# Patient Record
Sex: FEMALE | Race: WHITE | Employment: FULL TIME | URBAN - METROPOLITAN AREA
[De-identification: names, ages, dates, MRNs, and addresses within clinical notes are randomized per-mention and may not be internally consistent; named-entity substitution may affect disease eponyms.]

---

## 2017-01-03 ENCOUNTER — OFFICE VISIT (OUTPATIENT)
Dept: PSYCHOLOGY | Facility: CLINIC | Age: 47
End: 2017-01-03
Payer: COMMERCIAL

## 2017-01-03 DIAGNOSIS — F43.23 ADJUSTMENT DISORDER WITH MIXED ANXIETY AND DEPRESSED MOOD: Primary | ICD-10-CM

## 2017-01-03 PROCEDURE — 90834 PSYTX W PT 45 MINUTES: CPT | Performed by: MARRIAGE & FAMILY THERAPIST

## 2017-01-03 ASSESSMENT — ANXIETY QUESTIONNAIRES
2. NOT BEING ABLE TO STOP OR CONTROL WORRYING: NEARLY EVERY DAY
3. WORRYING TOO MUCH ABOUT DIFFERENT THINGS: NEARLY EVERY DAY
1. FEELING NERVOUS, ANXIOUS, OR ON EDGE: NEARLY EVERY DAY
6. BECOMING EASILY ANNOYED OR IRRITABLE: NEARLY EVERY DAY
5. BEING SO RESTLESS THAT IT IS HARD TO SIT STILL: SEVERAL DAYS
GAD7 TOTAL SCORE: 18
7. FEELING AFRAID AS IF SOMETHING AWFUL MIGHT HAPPEN: NEARLY EVERY DAY

## 2017-01-03 ASSESSMENT — PATIENT HEALTH QUESTIONNAIRE - PHQ9: 5. POOR APPETITE OR OVEREATING: MORE THAN HALF THE DAYS

## 2017-01-03 NOTE — PROGRESS NOTES
Progress Note    Client Name: Yarelis SMITH Host  Date: 1/3/2017         Service Type: Individual      Session Start Time: 9:30  Session End Time: 10:15      Session Length: 45     Session #: Return     Attendees: Client attended alone    Treatment Plan Last Reviewed: 12/15/2016  PHQ-9 / ROSE-7 : Each session     DATA      Progress Since Last Session (Related to Symptoms / Goals / Homework):   Symptoms: Improved    Homework: Achieved / completed to satisfaction      Episode of Care Goals: Satisfactory progress - ACTION (Actively working towards change); Intervened by reinforcing change plan / affirming steps taken     Current / Ongoing Stressors and Concerns:  Client came on her own today and processed her thoughts and feelings about her situation with divorce process. Nader continues to try to get her to rehash the past and take responsibiilty for her role in the break up. He is wracked with guilt and shame and struggling emotionally. Elisabeth sees she has no choice but to go through with it and sees how Nader is trying to get her to take care of his feelings and somehow stay friends. She wants to continue to meet as a couple as she learns things in our sessions that help in her own healing. They are not going through mediation and will try to navigate on their own. Says she is ok with this as long as they can do it together. Also wants to focus on taking care of herself moving forward. Goals of getting her own place, losing weight, adjusting to new boss, etc. Better self care.      Treatment Objective(s) Addressed in This Session:   Begin dealing with what she has been avoiding.  Take steps toward divorce.  Told Nader he cannot joke with Elisabeth sexually as this is very confusing for Elisabeth and counter productive. He wants her to be nice which may not be realistic.  Also Nader can try working on validation with her.  Teach S/L to couple     Intervention:                              Solution Focused: divorce process        ASSESSMENT: Current Emotional / Mental Status (status of significant symptoms):   Risk status (Self / Other harm or suicidal ideation)   Client denies current fears or concerns for personal safety.   Client denies current or recent suicidal ideation or behaviors.   Client denies current or recent homicidal ideation or behaviors.   Client denies current or recent self injurious behavior or ideation.   Client denies other safety concerns.   A safety and risk management plan has not been developed at this time, however client was given the after-hours number / 911 should there be a change in any of these risk factors.     Appearance:   Appropriate    Eye Contact:   Good    Psychomotor Behavior: Normal    Attitude:   Cooperative    Orientation:   All   Speech    Rate / Production: Normal     Volume:  Normal    Mood:    Anxious  Irritable  Sad    Affect:    Appropriate    Thought Content:  Clear    Thought Form:  Coherent  Logical    Insight:    Good      Medication Review:   No changes to current psychiatric medication(s)     Medication Compliance:   Yes     Changes in Health Issues:   None reported     Chemical Use Review:   Substance Use: Chemical use reviewed, no active concerns identified      Tobacco Use: No current tobacco use.       Collateral Reports Completed:   Not Applicable    PLAN: (Client Tasks / Therapist Tasks / Other)  Support client in divorce process by helping them communicate through difficult circumstances. Teach them S/L technique next time. Acknowledged to couple that they obviously do care about each other b/c not many couples do this work as they are ending their marriage. Elisabeth to continue with individual therapy.          Farida Salcedo                                                         ________________________________________________________________________    Treatment Plan    Client's Name: Yarelis SMITH Host  Date Of  Birth: 1970    Date: 12/15/2016    DSM-V Diagnoses: Adjustment Disorders  309.28 (F43.23) With mixed anxiety and depressed mood  Psychosocial / Contextual Factors:  from  for 2 years  WHODAS: 19    Referral / Collaboration:  Referral to another professional/service is not indicated at this time..    Anticipated number of session or this episode of care: 10      MeasurableTreatment Goal(s) related to diagnosis / functional impairment(s)  Goal 1: Client will lower GAD7 and PHQ9 scores to 3 or below    I will know I've met my goal when I'm less anxious and depressed..      Objective #A (Client Action)    Client will clarify marital goals with her ..  Status: Continued - Date(s):12/15/2016     Intervention(s)  Therapist will help support client..    Objective #B  Client will take action to move forward with her life..  Status: Continued - Date(s):12/15/2016     Intervention(s)  Therapist will help client ID what is important to her..    Objective #C  Client will experience grief and loss.  Status: Continued - Date(s):12/15/2016     Intervention(s)  Therapist will teach client about g & l process.          Client has reviewed and agreed to the above plan.      Farida Salcedo  December 15, 2016

## 2017-01-04 ASSESSMENT — ANXIETY QUESTIONNAIRES: GAD7 TOTAL SCORE: 18

## 2017-01-04 ASSESSMENT — PATIENT HEALTH QUESTIONNAIRE - PHQ9: SUM OF ALL RESPONSES TO PHQ QUESTIONS 1-9: 15

## 2017-01-12 ENCOUNTER — OFFICE VISIT (OUTPATIENT)
Dept: PSYCHOLOGY | Facility: CLINIC | Age: 47
End: 2017-01-12
Payer: COMMERCIAL

## 2017-01-12 DIAGNOSIS — F43.23 ADJUSTMENT DISORDER WITH MIXED ANXIETY AND DEPRESSED MOOD: Primary | ICD-10-CM

## 2017-01-12 PROCEDURE — 90834 PSYTX W PT 45 MINUTES: CPT | Performed by: MARRIAGE & FAMILY THERAPIST

## 2017-01-12 ASSESSMENT — ANXIETY QUESTIONNAIRES
2. NOT BEING ABLE TO STOP OR CONTROL WORRYING: NEARLY EVERY DAY
3. WORRYING TOO MUCH ABOUT DIFFERENT THINGS: MORE THAN HALF THE DAYS
5. BEING SO RESTLESS THAT IT IS HARD TO SIT STILL: SEVERAL DAYS
GAD7 TOTAL SCORE: 15
IF YOU CHECKED OFF ANY PROBLEMS ON THIS QUESTIONNAIRE, HOW DIFFICULT HAVE THESE PROBLEMS MADE IT FOR YOU TO DO YOUR WORK, TAKE CARE OF THINGS AT HOME, OR GET ALONG WITH OTHER PEOPLE: SOMEWHAT DIFFICULT
7. FEELING AFRAID AS IF SOMETHING AWFUL MIGHT HAPPEN: NEARLY EVERY DAY
1. FEELING NERVOUS, ANXIOUS, OR ON EDGE: MORE THAN HALF THE DAYS
6. BECOMING EASILY ANNOYED OR IRRITABLE: MORE THAN HALF THE DAYS

## 2017-01-12 ASSESSMENT — PATIENT HEALTH QUESTIONNAIRE - PHQ9: 5. POOR APPETITE OR OVEREATING: MORE THAN HALF THE DAYS

## 2017-01-12 NOTE — PROGRESS NOTES
Progress Note    Client Name: Yarelis SMITH Host  Date: 1/12/2017         Service Type: Individual      Session Start Time: 9:30  Session End Time: 10:15      Session Length: 45     Session #: Return     Attendees: Client attended alone    Treatment Plan Last Reviewed: 12/15/2016  PHQ-9 / ROSE-7 : Each session     DATA      Progress Since Last Session (Related to Symptoms / Goals / Homework):   Symptoms: Improved    Homework: Achieved / completed to satisfaction      Episode of Care Goals: Satisfactory progress - ACTION (Actively working towards change); Intervened by reinforcing change plan / affirming steps taken     Current / Ongoing Stressors and Concerns:  Client came on her own today even though Nader was supposed to come. She uninvited him because she learned he lied about dating someone. She said she really can't trust anything he says so she told him he couldn't come today.  Processed events leading up to this and her feelings today. She said learning this has helped her get unstuck and she says she can't put up with it anymore. Also wants to focus on taking care of herself moving forward. Goals of getting her own place, losing weight, adjusting to new boss, etc. Better self care.      Treatment Objective(s) Addressed in This Session:   Begin dealing with what she has been avoiding.  Continue with steps toward divorce.       Intervention:                             Solution Focused: divorce process        ASSESSMENT: Current Emotional / Mental Status (status of significant symptoms):   Risk status (Self / Other harm or suicidal ideation)   Client denies current fears or concerns for personal safety.   Client denies current or recent suicidal ideation or behaviors.   Client denies current or recent homicidal ideation or behaviors.   Client denies current or recent self injurious behavior or ideation.   Client denies other safety concerns.   A safety and risk  management plan has not been developed at this time, however client was given the after-hours number / 911 should there be a change in any of these risk factors.     Appearance:   Appropriate    Eye Contact:   Good    Psychomotor Behavior: Normal    Attitude:   Cooperative    Orientation:   All   Speech    Rate / Production: Normal     Volume:  Normal    Mood:    Irritable  Sad    Affect:    Appropriate    Thought Content:  Clear    Thought Form:  Coherent  Logical    Insight:    Good      Medication Review:   No changes to current psychiatric medication(s)     Medication Compliance:   Yes     Changes in Health Issues:   None reported     Chemical Use Review:   Substance Use: Chemical use reviewed, no active concerns identified      Tobacco Use: No current tobacco use.       Collateral Reports Completed:   Not Applicable    PLAN: (Client Tasks / Therapist Tasks / Other)  Support client in divorce process.      Farida Choco Denisse                                                         ________________________________________________________________________    Treatment Plan    Client's Name: Yarelis Leiva  YOB: 1970    Date: 12/15/2016    DSM-V Diagnoses: Adjustment Disorders  309.28 (F43.23) With mixed anxiety and depressed mood  Psychosocial / Contextual Factors:  from  for 2 years  WHODAS: 19    Referral / Collaboration:  Referral to another professional/service is not indicated at this time..    Anticipated number of session or this episode of care: 10      MeasurableTreatment Goal(s) related to diagnosis / functional impairment(s)  Goal 1: Client will lower GAD7 and PHQ9 scores to 3 or below    I will know I've met my goal when I'm less anxious and depressed..      Objective #A (Client Action)    Client will clarify marital goals with her ..  Status: Continued - Date(s):12/15/2016     Intervention(s)  Therapist will help support client..    Objective #B  Client will take  action to move forward with her life..  Status: Continued - Date(s):12/15/2016     Intervention(s)  Therapist will help client ID what is important to her..    Objective #C  Client will experience grief and loss.  Status: Continued - Date(s):12/15/2016     Intervention(s)  Therapist will teach client about g & l process.          Client has reviewed and agreed to the above plan.      Farida Salcedo  December 15, 2016

## 2017-01-13 ASSESSMENT — ANXIETY QUESTIONNAIRES: GAD7 TOTAL SCORE: 15

## 2017-01-13 ASSESSMENT — PATIENT HEALTH QUESTIONNAIRE - PHQ9: SUM OF ALL RESPONSES TO PHQ QUESTIONS 1-9: 10

## 2017-01-14 DIAGNOSIS — J31.0 OTHER RHINITIS: Primary | ICD-10-CM

## 2017-01-17 RX ORDER — FLUTICASONE PROPIONATE 50 MCG
2 SPRAY, SUSPENSION (ML) NASAL DAILY
Qty: 16 G | Refills: 3 | Status: SHIPPED | OUTPATIENT
Start: 2017-01-17 | End: 2017-02-14

## 2017-01-17 NOTE — TELEPHONE ENCOUNTER
fluticasone (FLONASE) 50 MCG/ACT nasal spray 16 g 3 4/1/2016          Last Written Prescription Date: 04/01/2016  Last Fill Quantity: 16g,  # refills: 3   Last Office Visit with [facility], [facility] or [facility] prescribing provider: 09/20/2016                                         Next 5 appointments (look out 90 days)     Jan 19, 2017  9:30 AM   Return Visit with Farida Wilhelm [facility] ([facility])    [address] [place] [id number]   [phone]

## 2017-01-17 NOTE — TELEPHONE ENCOUNTER
TC's:    Please call Patient to establish care and then route back to the refill pool.    Thank you    Nayeli Ren RN

## 2017-01-17 NOTE — TELEPHONE ENCOUNTER
Refill signed by Dr. Ramirez    TC's:    Please call Patient to schedule an appointment to establish care/physical.     Thank you    Nayeli Ren RN

## 2017-01-31 ENCOUNTER — OFFICE VISIT (OUTPATIENT)
Dept: PSYCHOLOGY | Facility: CLINIC | Age: 47
End: 2017-01-31
Payer: COMMERCIAL

## 2017-01-31 DIAGNOSIS — F43.23 ADJUSTMENT DISORDER WITH MIXED ANXIETY AND DEPRESSED MOOD: Primary | ICD-10-CM

## 2017-01-31 PROCEDURE — 90834 PSYTX W PT 45 MINUTES: CPT | Performed by: MARRIAGE & FAMILY THERAPIST

## 2017-01-31 ASSESSMENT — PATIENT HEALTH QUESTIONNAIRE - PHQ9: 5. POOR APPETITE OR OVEREATING: SEVERAL DAYS

## 2017-01-31 ASSESSMENT — ANXIETY QUESTIONNAIRES
IF YOU CHECKED OFF ANY PROBLEMS ON THIS QUESTIONNAIRE, HOW DIFFICULT HAVE THESE PROBLEMS MADE IT FOR YOU TO DO YOUR WORK, TAKE CARE OF THINGS AT HOME, OR GET ALONG WITH OTHER PEOPLE: SOMEWHAT DIFFICULT
2. NOT BEING ABLE TO STOP OR CONTROL WORRYING: SEVERAL DAYS
6. BECOMING EASILY ANNOYED OR IRRITABLE: SEVERAL DAYS
GAD7 TOTAL SCORE: 9
1. FEELING NERVOUS, ANXIOUS, OR ON EDGE: SEVERAL DAYS
3. WORRYING TOO MUCH ABOUT DIFFERENT THINGS: MORE THAN HALF THE DAYS
5. BEING SO RESTLESS THAT IT IS HARD TO SIT STILL: SEVERAL DAYS
7. FEELING AFRAID AS IF SOMETHING AWFUL MIGHT HAPPEN: MORE THAN HALF THE DAYS

## 2017-01-31 NOTE — PROGRESS NOTES
Progress Note    Client Name: Yarelis SMITH Host  Date: 1/30/2017         Service Type: Individual      Session Start Time: 9:30  Session End Time: 10:15      Session Length: 45     Session #: Return     Attendees: Client attended alone    Treatment Plan Last Reviewed: 12/15/2016  PHQ-9 / ROSE-7 : Each session     DATA      Progress Since Last Session (Related to Symptoms / Goals / Homework):   Symptoms: Improved    Homework: Achieved / completed to satisfaction      Episode of Care Goals: Satisfactory progress - ACTION (Actively working towards change); Intervened by reinforcing change plan / affirming steps taken     Current / Ongoing Stressors and Concerns:  Client processed events from past 2 weeks moving her things at home. Client is anxious to put this all behind her.      Treatment Objective(s) Addressed in This Session:   Begin dealing with what she has been avoiding.  Continue with steps toward divorce.       Intervention:                             Solution Focused: divorce process        ASSESSMENT: Current Emotional / Mental Status (status of significant symptoms):   Risk status (Self / Other harm or suicidal ideation)   Client denies current fears or concerns for personal safety.   Client denies current or recent suicidal ideation or behaviors.   Client denies current or recent homicidal ideation or behaviors.   Client denies current or recent self injurious behavior or ideation.   Client denies other safety concerns.   A safety and risk management plan has not been developed at this time, however client was given the after-hours number / 911 should there be a change in any of these risk factors.     Appearance:   Appropriate    Eye Contact:   Good    Psychomotor Behavior: Normal    Attitude:   Cooperative    Orientation:   All   Speech    Rate / Production: Normal     Volume:  Normal    Mood:    Irritable  Sad    Affect:    Appropriate    Thought  Content:  Clear    Thought Form:  Coherent  Logical    Insight:    Good      Medication Review:   No changes to current psychiatric medication(s)     Medication Compliance:   Yes     Changes in Health Issues:   None reported     Chemical Use Review:   Substance Use: Chemical use reviewed, no active concerns identified      Tobacco Use: No current tobacco use.       Collateral Reports Completed:   Not Applicable    PLAN: (Client Tasks / Therapist Tasks / Other)  Support client in divorce process.      Farida Salcedo                                                         ________________________________________________________________________    Treatment Plan    Client's Name: Yarelis Leiva  YOB: 1970    Date: 12/15/2016    DSM-V Diagnoses: Adjustment Disorders  309.28 (F43.23) With mixed anxiety and depressed mood  Psychosocial / Contextual Factors:  from  for 2 years  WHODAS: 19    Referral / Collaboration:  Referral to another professional/service is not indicated at this time..    Anticipated number of session or this episode of care: 10      MeasurableTreatment Goal(s) related to diagnosis / functional impairment(s)  Goal 1: Client will lower GAD7 and PHQ9 scores to 3 or below    I will know I've met my goal when I'm less anxious and depressed..      Objective #A (Client Action)    Client will clarify marital goals with her ..  Status: Continued - Date(s):12/15/2016     Intervention(s)  Therapist will help support client..    Objective #B  Client will take action to move forward with her life..  Status: Continued - Date(s):12/15/2016     Intervention(s)  Therapist will help client ID what is important to her..    Objective #C  Client will experience grief and loss.  Status: Continued - Date(s):12/15/2016     Intervention(s)  Therapist will teach client about g & l process.          Client has reviewed and agreed to the above plan.      Farida Mendiola  15, 2016

## 2017-02-01 ASSESSMENT — PATIENT HEALTH QUESTIONNAIRE - PHQ9: SUM OF ALL RESPONSES TO PHQ QUESTIONS 1-9: 8

## 2017-02-01 ASSESSMENT — ANXIETY QUESTIONNAIRES: GAD7 TOTAL SCORE: 9

## 2017-02-14 ENCOUNTER — OFFICE VISIT (OUTPATIENT)
Dept: PSYCHOLOGY | Facility: CLINIC | Age: 47
End: 2017-02-14
Payer: COMMERCIAL

## 2017-02-14 ENCOUNTER — OFFICE VISIT (OUTPATIENT)
Dept: FAMILY MEDICINE | Facility: CLINIC | Age: 47
End: 2017-02-14
Payer: COMMERCIAL

## 2017-02-14 VITALS
DIASTOLIC BLOOD PRESSURE: 77 MMHG | HEART RATE: 73 BPM | BODY MASS INDEX: 28.72 KG/M2 | TEMPERATURE: 98 F | HEIGHT: 67 IN | OXYGEN SATURATION: 97 % | SYSTOLIC BLOOD PRESSURE: 110 MMHG | WEIGHT: 183 LBS

## 2017-02-14 DIAGNOSIS — E55.9 VITAMIN D DEFICIENCY: ICD-10-CM

## 2017-02-14 DIAGNOSIS — E78.5 HYPERLIPIDEMIA, UNSPECIFIED HYPERLIPIDEMIA TYPE: ICD-10-CM

## 2017-02-14 DIAGNOSIS — Z13.29 SCREENING FOR THYROID DISORDER: ICD-10-CM

## 2017-02-14 DIAGNOSIS — Z79.899 CONTROLLED SUBSTANCE AGREEMENT SIGNED: ICD-10-CM

## 2017-02-14 DIAGNOSIS — J31.0 OTHER RHINITIS: ICD-10-CM

## 2017-02-14 DIAGNOSIS — F41.9 ANXIETY: ICD-10-CM

## 2017-02-14 DIAGNOSIS — F43.23 ADJUSTMENT DISORDER WITH MIXED ANXIETY AND DEPRESSED MOOD: Primary | ICD-10-CM

## 2017-02-14 DIAGNOSIS — Z79.899 MEDICATION MANAGEMENT: ICD-10-CM

## 2017-02-14 DIAGNOSIS — F41.8 DEPRESSION WITH ANXIETY: Primary | ICD-10-CM

## 2017-02-14 DIAGNOSIS — Z29.9 PREVENTIVE MEASURE: ICD-10-CM

## 2017-02-14 PROCEDURE — 90834 PSYTX W PT 45 MINUTES: CPT | Performed by: MARRIAGE & FAMILY THERAPIST

## 2017-02-14 PROCEDURE — 99214 OFFICE O/P EST MOD 30 MIN: CPT | Performed by: INTERNAL MEDICINE

## 2017-02-14 RX ORDER — FLUTICASONE PROPIONATE 50 MCG
2 SPRAY, SUSPENSION (ML) NASAL DAILY
Qty: 16 G | Refills: 3 | Status: SHIPPED | OUTPATIENT
Start: 2017-02-14 | End: 2017-08-03

## 2017-02-14 RX ORDER — ALPRAZOLAM 0.25 MG
0.25 TABLET ORAL 2 TIMES DAILY PRN
Qty: 30 TABLET | Refills: 1 | Status: SHIPPED | OUTPATIENT
Start: 2017-02-14 | End: 2017-04-12

## 2017-02-14 RX ORDER — FAMOTIDINE 20 MG
2000 TABLET ORAL DAILY
COMMUNITY
Start: 2017-02-14

## 2017-02-14 RX ORDER — CITALOPRAM HYDROBROMIDE 20 MG/1
20 TABLET ORAL DAILY
Qty: 90 TABLET | Refills: 1 | Status: SHIPPED | OUTPATIENT
Start: 2017-02-14 | End: 2017-09-05

## 2017-02-14 ASSESSMENT — ANXIETY QUESTIONNAIRES
IF YOU CHECKED OFF ANY PROBLEMS ON THIS QUESTIONNAIRE, HOW DIFFICULT HAVE THESE PROBLEMS MADE IT FOR YOU TO DO YOUR WORK, TAKE CARE OF THINGS AT HOME, OR GET ALONG WITH OTHER PEOPLE: SOMEWHAT DIFFICULT
3. WORRYING TOO MUCH ABOUT DIFFERENT THINGS: MORE THAN HALF THE DAYS
GAD7 TOTAL SCORE: 11
GAD7 TOTAL SCORE: 9
6. BECOMING EASILY ANNOYED OR IRRITABLE: SEVERAL DAYS
2. NOT BEING ABLE TO STOP OR CONTROL WORRYING: MORE THAN HALF THE DAYS
1. FEELING NERVOUS, ANXIOUS, OR ON EDGE: SEVERAL DAYS
3. WORRYING TOO MUCH ABOUT DIFFERENT THINGS: MORE THAN HALF THE DAYS
IF YOU CHECKED OFF ANY PROBLEMS ON THIS QUESTIONNAIRE, HOW DIFFICULT HAVE THESE PROBLEMS MADE IT FOR YOU TO DO YOUR WORK, TAKE CARE OF THINGS AT HOME, OR GET ALONG WITH OTHER PEOPLE: SOMEWHAT DIFFICULT
7. FEELING AFRAID AS IF SOMETHING AWFUL MIGHT HAPPEN: MORE THAN HALF THE DAYS
7. FEELING AFRAID AS IF SOMETHING AWFUL MIGHT HAPPEN: SEVERAL DAYS
6. BECOMING EASILY ANNOYED OR IRRITABLE: MORE THAN HALF THE DAYS
2. NOT BEING ABLE TO STOP OR CONTROL WORRYING: MORE THAN HALF THE DAYS
5. BEING SO RESTLESS THAT IT IS HARD TO SIT STILL: SEVERAL DAYS
1. FEELING NERVOUS, ANXIOUS, OR ON EDGE: SEVERAL DAYS
5. BEING SO RESTLESS THAT IT IS HARD TO SIT STILL: SEVERAL DAYS

## 2017-02-14 ASSESSMENT — PATIENT HEALTH QUESTIONNAIRE - PHQ9
5. POOR APPETITE OR OVEREATING: SEVERAL DAYS
5. POOR APPETITE OR OVEREATING: SEVERAL DAYS

## 2017-02-14 NOTE — MR AVS SNAPSHOT
After Visit Summary   2/14/2017    Yarelis Leiva    MRN: [id number]           Patient Information     Date Of Birth          1970        Visit Information        Provider Department      2/14/2017 3:00 PM Deepti Ramirez MD [facility]        Today's Diagnoses     Depression with anxiety    -  1    Anxiety        Other rhinitis        Preventive measure        Controlled substance agreement signed        Medication management        Hyperlipidemia, unspecified hyperlipidemia type        Screening for thyroid disorder        Vitamin D deficiency          Care Instructions    Schedule fasting labs in 2 months  Follow up in 3 months( physical)  Seek sooner medical attention if there is any worsening of symptoms or problems.          Follow-ups after your visit        Your next 10 appointments already scheduled     Mar 02, 2017  9:30 AM CST   [facility]    [address]  [place] [address] [phone]              Future tests that were ordered for you today     Open Future Orders        Priority Expected Expires Ordered    Basic metabolic panel Routine 3/1/2017 12/29/2017 2/14/2017    Vitamin D Deficiency Routine 3/1/2017 12/29/2017 2/14/2017    TSH with free T4 reflex Routine 3/1/2017 12/29/2017 2/14/2017    Lipid Profile Routine 3/1/2017 12/29/2017 2/14/2017            Who to contact     If you have questions or need follow up information about today's clinic visit or your schedule please contact [facility] directly at [phone].  Normal or non-critical lab and imaging results will be communicated to you by MyChart, letter or phone within 4 business days after the clinic has received the results. If you do not hear from us within 7 days, please contact the clinic through MyChart or phone. If you have a critical or abnormal lab result, we will notify you by phone as soon as  "possible.  Submit refill requests through [employer] or call your pharmacy and they will forward the refill request to us. Please allow 3 business days for your refill to be completed.          Additional Information About Your Visit        [employer] Information     [employer] gives you secure access to your electronic health record. If you see a primary care provider, you can also send messages to your care team and make appointments. If you have questions, please call your primary care clinic.  If you do not have a primary care provider, please call [phone] and they will assist you.        Care EveryWhere ID     This is your Care EveryWhere ID. This could be used by other organizations to access your [place] medical records  [id number]        Your Vitals Were     Pulse Temperature Height Pulse Oximetry BMI (Body Mass Index)       73 98  F (36.7  C) (Oral) 5' 6.5\" (1.689 m) 97% 29.09 kg/m2        Blood Pressure from Last 3 Encounters:   02/14/17 110/77   09/23/16 134/84   09/20/16 92/66    Weight from Last 3 Encounters:   02/14/17 183 lb (83 kg)   09/23/16 167 lb 9 oz (76 kg)   09/20/16 169 lb (76.7 kg)              We Performed the Following     DEPRESSION ACTION PLAN (DAP)          Today's Medication Changes          These changes are accurate as of: 2/14/17  3:45 PM.  If you have any questions, ask your nurse or doctor.               These medicines have changed or have updated prescriptions.        Dose/Directions    ALPRAZolam 0.25 MG tablet   Commonly known as:  XANAX   This may have changed:  when to take this   Used for:  Depression with anxiety   Changed by:  Deepti Ramirez MD        Dose:  0.25 mg   Take 1 tablet (0.25 mg) by mouth 2 times daily as needed for anxiety   Quantity:  30 tablet   Refills:  1       citalopram 20 MG tablet   Commonly known as:  celeXA   This may have changed:  See the new instructions.   Used for:  Depression with anxiety   Changed by:  Deepti Ramirez MD        Dose:  20 " mg   Take 1 tablet (20 mg) by mouth daily   Quantity:  90 tablet   Refills:  1       Vitamin D (Cholecalciferol) 1000 UNITS Caps   This may have changed:    - medication strength  - how much to take  - when to take this   Used for:  Preventive measure   Changed by:  Deepti Ramirez MD        Dose:  2000 Units   Take 2,000 Units by mouth daily   Refills:  0            Where to get your medicines      These medications were sent to [facility], [place] - [address], [facility] [address]  [address], [facility] [address], [facility] [address]     Phone:  [phone]     citalopram 20 MG tablet    fluticasone 50 MCG/ACT spray         Some of these will need a paper prescription and others can be bought over the counter.  Ask your nurse if you have questions.     Bring a paper prescription for each of these medications     ALPRAZolam 0.25 MG tablet       You don't need a prescription for these medications     Vitamin D (Cholecalciferol) 1000 UNITS Caps                Primary Care Provider Office Phone # Fax #    Deepti Ramirez [phone]       [facility]    [address]  [facility] [address]        Thank you!     Thank you for choosing [facility]  for your care. Our goal is always to provide you with excellent care. Hearing back from our patients is one way we can continue to improve our services. Please take a few minutes to complete the written survey that you may receive in the mail after your visit with us. Thank you!             Your Updated Medication List - Protect others around you: Learn how to safely use, store and throw away your medicines at www.disposemymeds.org.          This list is accurate as of: 2/14/17  3:45 PM.  Always use your most recent med list.                   Brand Name Dispense Instructions for use    ALPRAZolam 0.25 MG tablet    XANAX    30 tablet    Take 1 tablet (0.25 mg) by mouth 2 times daily as needed for  anxiety       citalopram 20 MG tablet    celeXA    90 tablet    Take 1 tablet (20 mg) by mouth daily       fluticasone 50 MCG/ACT spray    FLONASE    16 g    Spray 2 sprays into both nostrils daily       triamcinolone 0.1 % cream    KENALOG    80 g    Apply topically 2 times daily Apply sparingly to affected area 2  times daily as needed       Vitamin D (Cholecalciferol) 1000 UNITS Caps      Take 2,000 Units by mouth daily

## 2017-02-14 NOTE — PROGRESS NOTES
Progress Note    Client Name: Yarelis SMITH Host  Date: 2/14/2017         Service Type: Individual      Session Start Time: 9:30  Session End Time: 10:15      Session Length: 45     Session #: Return     Attendees: Client attended alone    Treatment Plan Last Reviewed: 12/15/2016  PHQ-9 / ROSE-7 : Each session     DATA      Progress Since Last Session (Related to Symptoms / Goals / Homework):   Symptoms: Improved    Homework: Achieved / completed to satisfaction      Episode of Care Goals: Satisfactory progress - ACTION (Actively working towards change); Intervened by reinforcing change plan / affirming steps taken     Current / Ongoing Stressors and Concerns:  Client finalizing divorce. Will have one more mediation with Nader. She made a point of making him participate even though he didn't want to. Things in his life have worsened with safety concerns. Client is done dealing with his drama. She also has a date tonight and is nervous but excited. Reminded her she has leverage with Nader as he doesn't want her mad at him. May also want to wait to tell him she is dating.     Treatment Objective(s) Addressed in This Session:   Begin dealing with what she has been avoiding.  Continue with steps toward divorce.       Intervention:                             Solution Focused: divorce process        ASSESSMENT: Current Emotional / Mental Status (status of significant symptoms):   Risk status (Self / Other harm or suicidal ideation)   Client denies current fears or concerns for personal safety.   Client denies current or recent suicidal ideation or behaviors.   Client denies current or recent homicidal ideation or behaviors.   Client denies current or recent self injurious behavior or ideation.   Client denies other safety concerns.   A safety and risk management plan has not been developed at this time, however client was given the after-hours number / 911 should there be a change  in any of these risk factors.     Appearance:   Appropriate    Eye Contact:   Good    Psychomotor Behavior: Normal    Attitude:   Cooperative    Orientation:   All   Speech    Rate / Production: Normal     Volume:  Normal    Mood:    Normal   Affect:    Appropriate    Thought Content:  Clear    Thought Form:  Coherent  Logical    Insight:    Good      Medication Review:   No changes to current psychiatric medication(s)     Medication Compliance:   Yes     Changes in Health Issues:   None reported     Chemical Use Review:   Substance Use: Chemical use reviewed, no active concerns identified      Tobacco Use: No current tobacco use.       Collateral Reports Completed:   Not Applicable    PLAN: (Client Tasks / Therapist Tasks / Other)  Support client in divorce process. Reinforce positive changes she is making.      Farida Wilhelm Randolphjose                                                         ________________________________________________________________________    Treatment Plan    Client's Name: Yarelis Leiva  YOB: 1970    Date: 12/15/2016    DSM-V Diagnoses: Adjustment Disorders  309.28 (F43.23) With mixed anxiety and depressed mood  Psychosocial / Contextual Factors:  from  for 2 years  WHODAS: 19    Referral / Collaboration:  Referral to another professional/service is not indicated at this time..    Anticipated number of session or this episode of care: 10      MeasurableTreatment Goal(s) related to diagnosis / functional impairment(s)  Goal 1: Client will lower GAD7 and PHQ9 scores to 3 or below    I will know I've met my goal when I'm less anxious and depressed..      Objective #A (Client Action)    Client will clarify marital goals with her ..  Status: Continued - Date(s):12/15/2016     Intervention(s)  Therapist will help support client..    Objective #B  Client will take action to move forward with her life..  Status: Continued - Date(s):12/15/2016      Intervention(s)  Therapist will help client ID what is important to her..    Objective #C  Client will experience grief and loss.  Status: Continued - Date(s):12/15/2016     Intervention(s)  Therapist will teach client about g & l process.          Client has reviewed and agreed to the above plan.      Farida Salcedo  December 15, 2016

## 2017-02-14 NOTE — NURSING NOTE
"Chief Complaint   Patient presents with     Establish Care            Initial /77 (BP Location: Left arm, Patient Position: Chair, Cuff Size: Adult Large)  Pulse 73  Temp 98  F (36.7  C) (Oral)  Ht 5' 6.5\" (1.689 m)  Wt 183 lb (83 kg)  SpO2 97%  BMI 29.09 kg/m2 Estimated body mass index is 29.09 kg/(m^2) as calculated from the following:    Height as of this encounter: 5' 6.5\" (1.689 m).    Weight as of this encounter: 183 lb (83 kg).  Medication Reconciliation: complete.  Jaci Francisco CMA      "

## 2017-02-14 NOTE — LETTER
My Depression Action Plan  Name: Yarelis Leiva   Date of Birth 1970  Date: 2/14/2017    My doctor: Deepti Ramirez   My clinic: [place] [address] [address] [facility] [address] [phone]          GREEN    ZONE   Good Control    What it looks like:     Things are going generally well. You have normal up s and down s. You may even feel depressed from time to time, but bad moods usually last less than a day.   What you need to do:  1. Continue to care for yourself (see self care plan)  2. Check your depression survival kit and update it as needed  3. Follow your physician s recommendations including any medication.  4. Do not stop taking medication unless you consult with your physician first.           YELLOW         ZONE Getting Worse    What it looks like:     Depression is starting to interfere with your life.     It may be hard to get out of bed; you may be starting to isolate yourself from others.    Symptoms of depression are starting to last most all day and this has happened for several days.     You may have suicidal thoughts but they are not constant.   What you need to do:     1. Call your care team, your response to treatment will improve if you keep your care team informed of your progress. Yellow periods are signs an adjustment may need to be made.     2. Continue your self-care, even if you have to fake it!    3. Talk to someone in your support network    4. Open up your depression survival kit           RED    ZONE Medical Alert - Get Help    What it looks like:     Depression is seriously interfering with your life.     You may experience these or other symptoms: You can t get out of bed most days, can t work or engage in other necessary activities, you have trouble taking care of basic hygiene, or basic responsibilities, thoughts of suicide or death that will not go away, self-injurious behavior.     What you need to do:  1. Call your care team and request  a same-day appointment. If they are not available (weekends or after hours) call your local crisis line, emergency room or 911.      Electronically signed by: Deepti Ramirez, February 14, 2017    Depression Self Care Plan / Survival Kit    Self-Care for Depression  Here s the deal. Your body and mind are really not as separate as most people think.  What you do and think affects how you feel and how you feel influences what you do and think. This means if you do things that people who feel good do, it will help you feel better.  Sometimes this is all it takes.  There is also a place for medication and therapy depending on how severe your depression is, so be sure to consult with your medical provider and/ or Behavioral Health Consultant if your symptoms are worsening or not improving.     In order to better manage my stress, I will:    Exercise  Get some form of exercise, every day. This will help reduce pain and release endorphins, the  feel good  chemicals in your brain. This is almost as good as taking antidepressants!  This is not the same as joining a gym and then never going! (they count on that by the way ) It can be as simple as just going for a walk or doing some gardening, anything that will get you moving.      Hygiene   Maintain good hygiene (Get out of bed in the morning, Make your bed, Brush your teeth, Take a shower, and Get dressed like you were going to work, even if you are unemployed).  If your clothes don't fit try to get ones that do.    Diet  I will strive to eat foods that are good for me, drink plenty of water, and avoid excessive sugar, caffeine, alcohol, and other mood-altering substances.  Some foods that are helpful in depression are: complex carbohydrates, B vitamins, flaxseed, fish or fish oil, fresh fruits and vegetables.    Psychotherapy  I agree to participate in Individual Therapy (if recommended).    Medication  If prescribed medications, I agree to take them.  Missing doses can  result in serious side effects.  I understand that drinking alcohol, or other illicit drug use, may cause potential side effects.  I will not stop my medication abruptly without first discussing it with my provider.    Staying Connected With Others  I will stay in touch with my friends, family members, and my primary care provider/team.    Use your imagination  Be creative.  We all have a creative side; it doesn t matter if it s oil painting, sand castles, or mud pies! This will also kick up the endorphins.    Witness Beauty  (AKA stop and smell the roses) Take a look outside, even in mid-winter. Notice colors, textures. Watch the squirrels and birds.     Service to others  Be of service to others.  There is always someone else in need.  By helping others we can  get out of ourselves  and remember the really important things.  This also provides opportunities for practicing all the other parts of the program.    Humor  Laugh and be silly!  Adjust your TV habits for less news and crime-drama and more comedy.    Control your stress  Try breathing deep, massage therapy, biofeedback, and meditation. Find time to relax each day.     My support system    Clinic Contact:  Phone number:    Contact 1:  Phone number:    Contact 2:  Phone number:    Sikh/:  Phone number:    Therapist:  Phone number:    Local crisis center:    Phone number:    Other community support:  Phone number:

## 2017-02-14 NOTE — PATIENT INSTRUCTIONS
Schedule fasting labs in 2 months  Follow up in 3 months( physical)  Seek sooner medical attention if there is any worsening of symptoms or problems.

## 2017-02-14 NOTE — LETTER
[facility]    02/14/17    Patient: Yarelis Leiva  YOB: 1970  Medical Record Number: [id number]                                                                  Controlled Substance Agreement  I understand that my care provider has prescribed controlled substances (narcotics, tranquilizers, and/or stimulants) to help manage my condition(s).  I am taking this medicine to help me function or work.  I know that this is strong medicine.  It could have serious side effects and even cause a dependency on the drug.  If I stop these medicines suddenly, I could have severe withdrawal symptoms.    The risks, benefits, and side effects of these medication(s) were explained to me.  I agree that:  1. I will take part in other treatments as advised by my provider.  This may be psychiatry or counseling, physical therapy, behavioral therapy, group treatment, or a referral to a pain clinic.  I will reduce or stop my medicine when my provider tells me to do so.   2. I will take my medicines as prescribed.  I will not change the dose or schedule unless my provider tells me to.  There will be no refills if I  run out early.   I may be contacted at any time without warning and asked to complete a drug test or pill count.   3. I will keep all my appointments at the clinic.  If I miss appointments or fail to follow instructions, my provider may stop my medicine.  4. I will not ask other providers to prescribe controlled substances. And I will not accept controlled substances from other people. If I need another prescribed controlled substance for a new reason, I will notify my provider within one business day.  5. If I enroll in the [place] Medical Marijuana program, I will tell my provider.  I will also sign an agreement to share my medical records with my provider.  6. I will use one pharmacy to fill all of my controlled substance prescriptions.  If my prescription is mailed to my pharmacy, it may take 5 to  7 days for my medicine to be ready.  7. I understand that my provider, clinic care team, and pharmacy can track controlled substance prescriptions from other providers through a central database (prescription monitoring program).  8. I will bring in my list of medications (or my medicine bottles) each time I come to the clinic.  REV- 04/2016                                                                                                                                            Page 1 of 2      [facility]    02/14/17    Patient: Yarelis Leiva  YOB: 1970  Medical Record Number: [id number]    9. Refills of controlled substances will be made only during office hours.  It is up to me to make sure that I do not run out of my medicines on weekends or holidays.    10. I am responsible for my prescriptions.  If the medicine is lost or stolen, it will not be replaced.   I also agree not to share these medicines with anyone.  11. I agree to not use ANY illegal or recreational drugs.  This includes marijuana, cocaine, bath salts or other drugs.  I agree not to use alcohol unless my provider says I may.  I agree to give urine samples whenever asked.  If I fail to give a urine sample, the provider may stop my medicine.     12. I will tell my nurse or provider right away if I become pregnant or have a new medical problem treated outside of [facility].  13. I understand that this medicine can affect my thinking and judgment.  It may be unsafe for me to drive, use machinery and do dangerous tasks.  I will not do any of these things until I know how the medicine affects me.  If my dose changes, I will wait to see how it affects me.  I will contact my provider if I have concerns about medicine side effects.  I understand that if I do not follow any of the conditions above, my prescriptions or treatment may be stopped.    I agree that my provider, clinic care team, and pharmacy may work with any city,  state or federal law enforcement agency that investigates the misuse, sale, or other diversion of my controlled medicine. I will allow my provider to discuss my care with or share a copy of this agreement with any other treating provider, pharmacy or emergency room where I receive care.  I agree to give up (waive) any right of privacy or confidentiality with respect to these authorizations.   I have read this agreement and have asked questions about anything I did not understand.   ___________________________________    ___________________________  Patient Signature                                                           Date and Time  ___________________________________     ____________________________  Witness                                                                            Date and Time  ___________________________________  Deepti Ramirez MD  REV-  04/2016                                                                                                                                                                 Page 2 of 2  {Controlled Substance Agreement (CSA) Patient Instructions:[id number]}

## 2017-02-14 NOTE — PROGRESS NOTES
SUBJECTIVE:                                                    Yarelis Leiva is a 47 year old female who presents to clinic today for the following health issues:      New Patient/Transfer of Care  Former patient of Dr. Sparks  Reviewed and updated medical, family, and social history    Medication Review  Reviewed, updated, and renewed medications as needed  Is compliant with her medications and has no concerns or known side effects  She is taking 5000 units of vitamin D twice daily  Was told to take this after her vitamin D levels were low  Her 4/1/16 labs showed high vitamin D levels and she was told cut back on her vitamin D intake  She missed this recommendation and continued taking the high dose    -------------------------------------    Problem list and histories reviewed & adjusted, as indicated.  Additional history: as documented    Patient Active Problem List   Diagnosis     Depression     Anxiety     Gallbladder disease     Past Surgical History   Procedure Laterality Date     Hysterectomy total abdominal       Hysterectomy, pap no longer indicated       Orif radius/humerus       Mouth surgery       Laparoscopic cholecystectomy N/A 9/23/2016     Procedure: LAPAROSCOPIC CHOLECYSTECTOMY;  Surgeon: Rory Davis MD;  [facility]       Social History   Substance Use Topics     Smoking status: Former Smoker     Smokeless tobacco: Never Used      Comment: SMOKED X 1 YR, QUIT OVER 20 YRS AGO     Alcohol use 0.0 oz/week     0 Standard drinks or equivalent per week      Comment: occ.     Family History   Problem Relation Age of Onset     Thyroid Disease Father      Prostate Cancer Father      DIABETES Maternal Grandmother      Other Cancer Paternal Grandmother      BRAIN CANCER     Hypertension       Hypertension Mother          Current Outpatient Prescriptions   Medication Sig Dispense Refill     fluticasone (FLONASE) 50 MCG/ACT spray Spray 2 sprays into both nostrils daily 16 g 3     ALPRAZolam  "(XANAX) 0.25 MG tablet Take 1 tablet (0.25 mg) by mouth 3 times daily as needed for anxiety 30 tablet 0     citalopram (CELEXA) 20 MG tablet TAKE ONE TABLET BY MOUTH EVERY DAY 90 tablet 1     VITAMIN D, CHOLECALCIFEROL, PO Take 5,000 Units by mouth 2 times daily       triamcinolone (KENALOG) 0.1 % cream Apply topically 2 times daily Apply sparingly to affected area 2  times daily as needed 80 g 0     Allergies   Allergen Reactions     Cats      Seasonal Allergies        ROS:  Constitutional, neuro, ENT, endocrine, pulmonary, cardiac, gastrointestinal, genitourinary, musculoskeletal, integument and psychiatric systems are negative, except as otherwise noted.      This document serves as a record of the services and decisions personally performed and made by Deepti Ramirez MD. It was created on her behalf by José Miguel Bailey, a trained medical scribe. The creation of this document is based on the provider's statements to the medical scribe.    Scribe José Miguel Bailey 3:27 PM, February 14, 2017    OBJECTIVE:                                                    /77 (BP Location: Left arm, Patient Position: Chair, Cuff Size: Adult Large)  Pulse 73  Temp 98  F (36.7  C) (Oral)  Ht 1.689 m (5' 6.5\")  Wt 83 kg (183 lb)  SpO2 97%  BMI 29.09 kg/m2  Body mass index is 29.09 kg/(m^2).  GENERAL APPEARANCE: healthy, alert and no distress  PSYCH: mentation appears normal and affect normal/bright    Reviewed 4/1/16 vitamin D deficiency screening     ASSESSMENT/PLAN:                                                      Yarelis was seen today for establish care.    Diagnoses and all orders for this visit:    Depression with anxiety  Patient uses Celexa and Xanax to treat her depression and anxiety  Patient was reminded that this is a controlled substance that can be habit forming and should be taken as prescribed and not overused. Advised her to be careful with driving, not to use it with alcohol, and to not lose the prescription "   -     citalopram (CELEXA) 20 MG tablet; Take 1 tablet (20 mg) by mouth daily  -     ALPRAZolam (XANAX) 0.25 MG tablet; Take 1 tablet (0.25 mg) by mouth 2 times daily as needed for anxiety    Anxiety  See the above note    Other rhinitis  This is well controlled on the medication, no concerns or known side effects  Will continue current treatment  -     fluticasone (FLONASE) 50 MCG/ACT spray; Spray 2 sprays into both nostrils daily    Preventive measure  Patient was taking 5000 units of vitamin D twice a day due to past vitamin D deficiency  Labs 4/1/16 labs showed high vitamin D levels and she was told to decrease her intake  She missed this message and never decreased her intake  She will decrease vitamin D intake to 2000 units by mouth daily  Will check vitamin D levels in two months  -     Vitamin D, Cholecalciferol, 1000 UNITS CAPS; Take 2,000 Units by mouth daily    Controlled substance agreement signed  Patient was informed about and signed a controlled substance agreement    Medication management  -     Basic metabolic panel; Future    Hyperlipidemia, unspecified hyperlipidemia type  Fasting labs in 2 months  -     Lipid Profile; Future    Screening for thyroid disorder  -     TSH with free T4 reflex; Future    Vitamin D deficiency  See the above note  -     Vitamin D Deficiency; Future    Other orders  -     DEPRESSION ACTION PLAN (DAP)    Preventive health counseling was also done.    Follow up in 3 months for physical  Patient was advised to seek sooner medical attention if there is any new or worsening symptoms    The information in this document, created by the medical scribe for me, accurately reflects the services I personally performed and the decisions made by me. I have reviewed and approved this document for accuracy prior to leaving the patient care area.  Deepti Ramirez MD  3:27 PM, 02/14/17    Deepti Ramirez MD  [facility]

## 2017-02-15 ASSESSMENT — ANXIETY QUESTIONNAIRES
GAD7 TOTAL SCORE: 11
GAD7 TOTAL SCORE: 9

## 2017-02-15 ASSESSMENT — PATIENT HEALTH QUESTIONNAIRE - PHQ9
SUM OF ALL RESPONSES TO PHQ QUESTIONS 1-9: 11
SUM OF ALL RESPONSES TO PHQ QUESTIONS 1-9: 6

## 2017-04-12 DIAGNOSIS — F41.8 DEPRESSION WITH ANXIETY: ICD-10-CM

## 2017-04-12 RX ORDER — ALPRAZOLAM 0.25 MG
0.25 TABLET ORAL 2 TIMES DAILY PRN
Qty: 30 TABLET | Refills: 0 | Status: SHIPPED | OUTPATIENT
Start: 2017-04-12 | End: 2017-08-03

## 2017-04-12 NOTE — TELEPHONE ENCOUNTER
has not been checked for this patient    Controlled Substance Refill Request for alprazolam  Problem List Complete:  Yes    Last Written Prescription Date:  2-14-17  Last Fill Quantity: 30,   # refills: 1    [place] [facility] [place] 2-14-17 Ashley    Clinic visit frequency required: Q 6  months     Future Office visit:     Controlled substance agreement on file: Yes:  Date 2-14-17 Dr Ramirez.     Processing:  Staff will hand deliver Rx to on-site pharmacy   checked in past 6 months?  No, route to RN not a pain medication, do not route    Kathy Medrano, RT (R)

## 2017-08-02 DIAGNOSIS — T78.40XS ALLERGIC STATE, SEQUELA: ICD-10-CM

## 2017-08-02 DIAGNOSIS — J31.0 OTHER RHINITIS: ICD-10-CM

## 2017-08-03 DIAGNOSIS — F41.8 DEPRESSION WITH ANXIETY: ICD-10-CM

## 2017-08-03 RX ORDER — FLUTICASONE PROPIONATE 50 MCG
2 SPRAY, SUSPENSION (ML) NASAL DAILY
Qty: 16 G | Refills: 5 | Status: SHIPPED | OUTPATIENT
Start: 2017-08-03 | End: 2018-04-04

## 2017-08-03 RX ORDER — ALPRAZOLAM 0.25 MG
0.25 TABLET ORAL 2 TIMES DAILY PRN
Qty: 30 TABLET | Refills: 0 | Status: SHIPPED | OUTPATIENT
Start: 2017-08-03 | End: 2019-11-19

## 2017-08-03 RX ORDER — FLUTICASONE PROPIONATE 50 MCG
SPRAY, SUSPENSION (ML) NASAL
Qty: 16 G | Refills: 5 | Status: SHIPPED | OUTPATIENT
Start: 2017-08-03 | End: 2017-08-03

## 2017-08-03 NOTE — TELEPHONE ENCOUNTER
Controlled Substance Refill Request for Xanax 0.25 MG  Problem List Complete:  No     PROVIDER TO CONSIDER COMPLETION OF PROBLEM LIST AND OVERVIEW/CONTROLLED SUBSTANCE AGREEMENT    Last Written Prescription Date:  4-12-17  Last Fill Quantity: 30,   # refills: 0    [place] [facility] [place]14-17    Future Office visit:     Controlled substance agreement on file: Yes:  Date 2-14-17.     Processing:  Staff will hand deliver Rx to on-site pharmacy     checked in past 6 months?  No, route to RN     Thank You!  Audrey Alberts  [facility]

## 2017-08-03 NOTE — TELEPHONE ENCOUNTER
fluticasone (FLONASE) 50 MCG/ACT spray        Last Written Prescription Date: 2/14/2017  Last Fill Quantity: 16g,  # refills: 3   Last Office Visit with [facility], [facility] or [facility] prescribing provider: 2/14/2017

## 2017-08-03 NOTE — TELEPHONE ENCOUNTER
Prescription approved per [facility] Refill Protocol.    Wrong dose was pended and signed for 1 spray, Current rx is supposed to be 2 sprays per last order from PCP- reordered for 2 sprays and added in pharm comments to disregard previous order just sent for 1 spray.    Gita Ngo RN

## 2017-09-05 DIAGNOSIS — F41.8 DEPRESSION WITH ANXIETY: ICD-10-CM

## 2017-09-06 ENCOUNTER — FCC EXTENDED DOCUMENTATION (OUTPATIENT)
Dept: PSYCHOLOGY | Facility: CLINIC | Age: 47
End: 2017-09-06

## 2017-09-06 RX ORDER — CITALOPRAM HYDROBROMIDE 20 MG/1
TABLET ORAL
Qty: 90 TABLET | Refills: 0 | Status: SHIPPED | OUTPATIENT
Start: 2017-09-06 | End: 2018-01-16

## 2017-09-06 NOTE — PROGRESS NOTES
Discharge Summary  Multiple Sessions    Client Name: Yarelis Leiva MRN#: [id number] YOB: 1970      Intake / Discharge Date: 9/8/2016      -     9/6/2017      DSM5 Diagnoses: (Sustained by DSM5 Criteria Listed Above)  Diagnoses: Adjustment Disorders  309.28 (F43.23) With mixed anxiety and depressed mood  Psychosocial & Contextual Factors:  from   WHODAS 2.0 (12 item) Score: 19          Presenting Concern:  Managing anxiety about separation from .      Reason for Discharge:  Goals completed      Disposition at Time of Last Encounter:   Comments:   Client moved forward with divorce.     Risk Management:   Client denies a history of suicidal ideation, suicide attempts, self-injurious behavior, homicidal ideation, homicidal behavior and and other safety concerns  A safety and risk management plan has not been developed at this time, however client was given the after-hours number / 911 should there be a change in any of these risk factors.      Referred To:  N/A        Farida Salcedo   9/6/2017

## 2017-09-06 NOTE — TELEPHONE ENCOUNTER
citalopram (CELEXA) 20 MG tablet       Last Written Prescription Date: 2/14/2017  Last Fill Quantity: 90, # refills: 1  Last Office Visit with G primary care provider:  2/14/2017        Last PHQ-9 score on record=   PHQ-9 SCORE 2/14/2017   Total Score 11

## 2017-12-05 DIAGNOSIS — T78.40XS ALLERGIC STATE, SEQUELA: ICD-10-CM

## 2017-12-05 NOTE — TELEPHONE ENCOUNTER
flonase      Last Written Prescription Date:8/3/17  Last Fill Quantity: 16g,  # refills: 5   Last Office Visit with G, P or [facility] prescribing provider: 2/14/17                                            rachael brower

## 2017-12-06 RX ORDER — FLUTICASONE PROPIONATE 50 MCG
SPRAY, SUSPENSION (ML) NASAL
Qty: 16 G | Refills: 2 | Status: SHIPPED | OUTPATIENT
Start: 2017-12-06 | End: 2018-04-04

## 2018-01-16 DIAGNOSIS — F41.8 DEPRESSION WITH ANXIETY: ICD-10-CM

## 2018-01-16 NOTE — TELEPHONE ENCOUNTER
"Requested Prescriptions   Pending Prescriptions Disp Refills     citalopram (CELEXA) 20 MG tablet [Pharmacy Med Name: CITALOPRAM 20MG TAB] 90 tablet 0     Sig: TAKE ONE TABLET BY MOUTH EVERY DAY    SSRIs Protocol Failed    1/16/2018  9:37 AM       Failed - PHQ-9 score less than 5 in past 6 months    The PHQ-9 criteria is meant to fail. It requires a PHQ-9 score review         Failed - Recent (6 mo) or future visit with authorizing provider's specialty    Patient had office visit in the last 6 months or has a visit in the next 30 days with authorizing provider.  See \"Patient Info\" tab in inbasket, or \"Choose Columns\" in Meds & Orders section of the refill encounter.          Passed - Recent or future visit with authorizing provider    Patient had office visit in the last year or has a visit in the next 30 days with authorizing provider.  See \"Patient Info\" tab in inbasket, or \"Choose Columns\" in Meds & Orders section of the refill encounter.              Passed - Patient is age 18 or older       Passed - No active pregnancy on record       Passed - No positive pregnancy test in last 12 months        PHQ-9 SCORE 1/31/2017 2/14/2017 2/14/2017   Total Score 8 6 11       Last Written Prescription Date:  9/06/17  Last Fill Quantity: 90 tablet,  # refills: 0   Last Office Visit with [facility], [facility] or [facility] prescribing provider:  2/14/17 Soomar   Future Office Visit:       "

## 2018-01-17 RX ORDER — CITALOPRAM HYDROBROMIDE 20 MG/1
TABLET ORAL
Qty: 30 TABLET | Refills: 0 | Status: SHIPPED | OUTPATIENT
Start: 2018-01-17 | End: 2018-04-04

## 2018-01-17 NOTE — TELEPHONE ENCOUNTER
Medication is being filled for 1 time refill only due to:  Patient needs to be seen because due for yearly appt Feb.   Fely CHAMBERS RN

## 2018-04-04 ENCOUNTER — OFFICE VISIT (OUTPATIENT)
Dept: FAMILY MEDICINE | Facility: CLINIC | Age: 48
End: 2018-04-04
Payer: COMMERCIAL

## 2018-04-04 VITALS
BODY MASS INDEX: 30.68 KG/M2 | HEART RATE: 89 BPM | TEMPERATURE: 99.2 F | HEIGHT: 66 IN | OXYGEN SATURATION: 96 % | DIASTOLIC BLOOD PRESSURE: 81 MMHG | WEIGHT: 190.9 LBS | SYSTOLIC BLOOD PRESSURE: 114 MMHG

## 2018-04-04 DIAGNOSIS — J06.9 UPPER RESPIRATORY TRACT INFECTION, UNSPECIFIED TYPE: Primary | ICD-10-CM

## 2018-04-04 DIAGNOSIS — F41.8 DEPRESSION WITH ANXIETY: ICD-10-CM

## 2018-04-04 DIAGNOSIS — J31.0 CHRONIC RHINITIS, UNSPECIFIED TYPE: ICD-10-CM

## 2018-04-04 DIAGNOSIS — R05.9 COUGH: ICD-10-CM

## 2018-04-04 PROCEDURE — 99214 OFFICE O/P EST MOD 30 MIN: CPT | Performed by: INTERNAL MEDICINE

## 2018-04-04 RX ORDER — AZITHROMYCIN 250 MG/1
TABLET, FILM COATED ORAL
Qty: 6 TABLET | Refills: 2 | Status: SHIPPED | OUTPATIENT
Start: 2018-04-04 | End: 2019-11-19

## 2018-04-04 RX ORDER — PREDNISONE 20 MG/1
20 TABLET ORAL 2 TIMES DAILY WITH MEALS
Qty: 10 TABLET | Refills: 1 | Status: SHIPPED | OUTPATIENT
Start: 2018-04-04 | End: 2019-11-19

## 2018-04-04 RX ORDER — FLUTICASONE PROPIONATE 50 MCG
SPRAY, SUSPENSION (ML) NASAL
Qty: 16 G | Refills: 3 | Status: SHIPPED | OUTPATIENT
Start: 2018-04-04

## 2018-04-04 RX ORDER — CITALOPRAM HYDROBROMIDE 20 MG/1
20 TABLET ORAL DAILY
Qty: 90 TABLET | Refills: 3 | Status: SHIPPED | OUTPATIENT
Start: 2018-04-04 | End: 2019-06-21

## 2018-04-04 NOTE — MR AVS SNAPSHOT
"              After Visit Summary   4/4/2018    Yarelis Leiva    MRN: [id number]           Patient Information     Date Of Birth          1970        Visit Information        Provider Department      4/4/2018 12:20 PM Aaliyah Denise MD [facility]        Today's Diagnoses     Upper respiratory tract infection, unspecified type    -  1    Cough        Depression with anxiety        Chronic rhinitis, unspecified type           Follow-ups after your visit        Who to contact     If you have questions or need follow up information about today's clinic visit or your schedule please contact [facility] directly at [phone].  Normal or non-critical lab and imaging results will be communicated to you by Accupalhart, letter or phone within 4 business days after the clinic has received the results. If you do not hear from us within 7 days, please contact the clinic through Accupalhart or phone. If you have a critical or abnormal lab result, we will notify you by phone as soon as possible.  Submit refill requests through LocalS[employer] or call your pharmacy and they will forward the refill request to us. Please allow 3 business days for your refill to be completed.          Additional Information About Your Visit        MyChart Information     LocalS[employer] gives you secure access to your electronic health record. If you see a primary care provider, you can also send messages to your care team and make appointments. If you have questions, please call your primary care clinic.  If you do not have a primary care provider, please call [phone] and they will assist you.        Care EveryWhere ID     This is your Care EveryWhere ID. This could be used by other organizations to access your [facility] medical records  [id number]        Your Vitals Were     Pulse Temperature Height Pulse Oximetry BMI (Body Mass Index)       89 99.2  F (37.3  C) (Oral) 5' 6\" (1.676 m) 96% 30.81 kg/m2        Blood Pressure from " Last 3 Encounters:   04/04/18 114/81   02/14/17 110/77   09/23/16 134/84    Weight from Last 3 Encounters:   04/04/18 190 lb 14.4 oz (86.6 kg)   02/14/17 183 lb (83 kg)   09/23/16 167 lb 9 oz (76 kg)              Today, you had the following     No orders found for display         Today's Medication Changes          These changes are accurate as of 4/4/18 11:59 PM.  If you have any questions, ask your nurse or doctor.               Start taking these medicines.        Dose/Directions    azithromycin 250 MG tablet   Commonly known as:  ZITHROMAX   Used for:  Upper respiratory tract infection, unspecified type, Cough   Started by:  Aaliyah Denise MD        Two tablets first day, then one tablet daily for four days.   Quantity:  6 tablet   Refills:  2       predniSONE 20 MG tablet   Commonly known as:  DELTASONE   Used for:  Upper respiratory tract infection, unspecified type, Cough   Started by:  Aaliyah Denise MD        Dose:  20 mg   Take 1 tablet (20 mg) by mouth 2 times daily (with meals)   Quantity:  10 tablet   Refills:  1         These medicines have changed or have updated prescriptions.        Dose/Directions    citalopram 20 MG tablet   Commonly known as:  celeXA   This may have changed:  See the new instructions.   Used for:  Depression with anxiety   Changed by:  Aaliyah Denise MD        Dose:  20 mg   Take 1 tablet (20 mg) by mouth daily   Quantity:  90 tablet   Refills:  3       fluticasone 50 MCG/ACT spray   Commonly known as:  FLONASE   This may have changed:  See the new instructions.   Changed by:  Aaliyah Denise MD        USE ONE SPRAY IN EACH NOSTRIL EVERY DAY   Quantity:  16 g   Refills:  3            Where to get your medicines      These medications were sent to [facility], [place] - [address], [address]  [address], [place] [address], [facility] [address]     Phone:  [phone]     azithromycin 250 MG tablet    citalopram 20 MG tablet     fluticasone 50 MCG/ACT spray    predniSONE 20 MG tablet                Primary Care Provider Office Phone # Fax #    Deepti LEIGH MD Ashley [phone] [address] [address]        Equal Access to Services     [facility]: Hadii aad ku hadmichelleo Sogiovannyali, waaxda luqadaha, qaybta kaalmada adeegyada, waxay idiin haycoopern adeeg hardik la'coopertodd . So [facility] [phone].    ATENCIÓN: Si habla español, tiene a lakhani disposición servicios gratuitos de asistencia lingüística. Llame al [phone].    We comply with applicable federal civil rights laws and [place] laws. We do not discriminate on the basis of race, color, national origin, age, disability, sex, sexual orientation, or gender identity.            Thank you!     Thank you for choosing [facility]  for your care. Our goal is always to provide you with excellent care. Hearing back from our patients is one way we can continue to improve our services. Please take a few minutes to complete the written survey that you may receive in the mail after your visit with us. Thank you!             Your Updated Medication List - Protect others around you: Learn how to safely use, store and throw away your medicines at www.disposemymeds.org.          This list is accurate as of 4/4/18 11:59 PM.  Always use your most recent med list.                   Brand Name Dispense Instructions for use Diagnosis    ALPRAZolam 0.25 MG tablet    XANAX    30 tablet    Take 1 tablet (0.25 mg) by mouth 2 times daily as needed for anxiety    Depression with anxiety       azithromycin 250 MG tablet    ZITHROMAX    6 tablet    Two tablets first day, then one tablet daily for four days.    Upper respiratory tract infection, unspecified type, Cough       citalopram 20 MG tablet    celeXA    90 tablet    Take 1 tablet (20 mg) by mouth daily    Depression with anxiety       fluticasone 50 MCG/ACT spray    FLONASE    16 g    USE ONE SPRAY IN  EACH NOSTRIL EVERY DAY        predniSONE 20 MG tablet    DELTASONE    10 tablet    Take 1 tablet (20 mg) by mouth 2 times daily (with meals)    Upper respiratory tract infection, unspecified type, Cough       triamcinolone 0.1 % cream    KENALOG    80 g    Apply topically 2 times daily Apply sparingly to affected area 2  times daily as needed    Rash       Vitamin D (Cholecalciferol) 1000 UNITS Caps      Take 2,000 Units by mouth daily    Preventive measure

## 2018-04-04 NOTE — PROGRESS NOTES
"  SUBJECTIVE:   Yarelis Leiva is a 48 year old female who presents to clinic today for the following health issues:      RESPIRATORY SYMPTOMS      Duration: 5 days    Description  facial pain/pressure, cough, wheezing, headache, fatigue/malaise, hoarse voice, myalgias and Chest feel \"heavy\"    Severity: moderate    Accompanying signs and symptoms: None    History (predisposing factors):  none    Precipitating or alleviating factors: None    Therapies tried and outcome:  rest and fluids OTC NSAID nasal spray/wash - Cough drops        Current Medications:     Current Outpatient Prescriptions   Medication Sig Dispense Refill     azithromycin (ZITHROMAX) 250 MG tablet Two tablets first day, then one tablet daily for four days. 6 tablet 2     fluticasone (FLONASE) 50 MCG/ACT spray USE ONE SPRAY IN EACH NOSTRIL EVERY DAY 16 g 3     predniSONE (DELTASONE) 20 MG tablet Take 1 tablet (20 mg) by mouth 2 times daily (with meals) 10 tablet 1     citalopram (CELEXA) 20 MG tablet Take 1 tablet (20 mg) by mouth daily 90 tablet 3     ALPRAZolam (XANAX) 0.25 MG tablet Take 1 tablet (0.25 mg) by mouth 2 times daily as needed for anxiety 30 tablet 0     Vitamin D, Cholecalciferol, 1000 UNITS CAPS Take 2,000 Units by mouth daily       triamcinolone (KENALOG) 0.1 % cream Apply topically 2 times daily Apply sparingly to affected area 2  times daily as needed 80 g 0         Allergies:      Allergies   Allergen Reactions     Cats      Seasonal Allergies             Past Medical History:     Past Medical History:   Diagnosis Date     Anxiety      Broken arm     radius/ humerus     Depression      Fibroid uterus      Gallstones      H/O seasonal allergies          Past Surgical History:     Past Surgical History:   Procedure Laterality Date     HEAD & NECK SURGERY      wisdom tooth extraction     HYSTERECTOMY TOTAL ABDOMINAL       HYSTERECTOMY, PAP NO LONGER INDICATED       LAPAROSCOPIC CHOLECYSTECTOMY N/A 9/23/2016    Procedure: " "LAPAROSCOPIC CHOLECYSTECTOMY;  Surgeon: Rory Davis MD;  Location: [facility]     ORTHOPEDIC SURGERY      fracture of radius and ulna     ORTHOPEDIC SURGERY      knee frcture          Family Medical History:     Family History   Problem Relation Age of Onset     Thyroid Disease Father      Prostate Cancer Father      DIABETES Maternal Grandmother      Other Cancer Paternal Grandmother      BRAIN CANCER     Hypertension Other      Hypertension Mother      Colon Cancer No family hx of      Breast Cancer No family hx of          Social History:     Social History     Social History     Marital status:      Spouse name: N/A     Number of children: N/A     Years of education: N/A     Occupational History     Not on file.     Social History Main Topics     Smoking status: Former Smoker     Smokeless tobacco: Never Used      Comment: SMOKED X 1 YR, QUIT OVER 20 YRS AGO     Alcohol use 0.0 oz/week     0 Standard drinks or equivalent per week      Comment: 3 drinks per month     Drug use: No     Sexual activity: Not Currently     Partners: Male     Other Topics Concern     Not on file     Social History Narrative           Review of System:     Constitutional: Negative for fever or chills  Skin: Negative for rashes  Ears/Nose/Throat: Positive for chronic allergic rhinitis  Respiratory: positive for cough  Cardiovascular: Negative for chest pain  Gastrointestinal: Negative for nausea, vomiting  Genitourinary: Negative for dysuria, hematuria  Musculoskeletal: Negative for myalgias  Neurologic: Negative for headaches  Psychiatric: Positive for depression  Hematologic/Lymphatic/Immunologic: Negative  Endocrine: Negative  Behavioral: Negative for tobacco use       Physical Exam:   /81 (BP Location: Left arm, Cuff Size: Adult Large)  Pulse 89  Temp 99.2  F (37.3  C) (Oral)  Ht 5' 6\" (1.676 m)  Wt 190 lb 14.4 oz (86.6 kg)  SpO2 96%  BMI 30.81 kg/m2    GENERAL:  alert and no distress  EYES: eyes grossly normal " to inspection, and conjunctivae and sclerae normal  HENT: Normocephalic atraumatic. Nose and mouth without ulcers or lesions. Nasal congestion and rhinorrhea present  NECK: supple  RESP: intermittent dry coughing spells present  CV: regular rate and rhythm, normal S1 S2  LYMPH: no peripheral edema   ABDOMEN: nondistended  MS: no gross musculoskeletal defects noted  SKIN: no suspicious lesions or rashes  NEURO: Alert & Oriented x 3.   PSYCH: mentation appears normal, affect normal        Diagnostic Test Results:   CBC RESULTS:   Recent Labs   Lab Test  09/20/16   1139  09/12/16   1305   WBC   --   7.5   RBC   --   4.55   HGB  13.1  13.1   HCT   --   39.5   MCV   --   87   MCH   --   28.8   MCHC   --   33.2   RDW   --   13.4   PLT   --   274       ASSESSMENT/PLAN:       (J06.9) Upper respiratory tract infection, unspecified type  (primary encounter diagnosis)  (R05) Cough  Comment: several days of new URI symptoms with cough  Plan: I have prescribed azithromycin (ZITHROMAX) 250 MG tablet, predniSONE (DELTASONE) 20 MG tablet for treatment.            (J31.0) Chronic rhinitis, unspecified type  Comment: patient is due for a refill of her Flonase medication for allergic rhinitis  Plan: I have refilled the patient's fluticasone (FLONASE) 50 MCG/ACT spray medication today.      (F41.8) Depression with anxiety  Comment: depression symptoms stable on current Celexa medication.  Plan: I have refilled the patient's citalopram (CELEXA) 20 MG tablet medication today.                  Follow Up Plan:     Patient is instructed to return to Internal Medicine clinic for follow-up visit in 1 week.        Aaliyah Denise MD  Internal Medicine  [facility]

## 2018-04-04 NOTE — NURSING NOTE
"Chief Complaint   Patient presents with     URI       Initial /81 (BP Location: Left arm, Cuff Size: Adult Large)  Pulse 89  Temp 99.2  F (37.3  C) (Oral)  Ht 5' 6\" (1.676 m)  Wt 190 lb 14.4 oz (86.6 kg)  SpO2 96%  BMI 30.81 kg/m2 Estimated body mass index is 30.81 kg/(m^2) as calculated from the following:    Height as of this encounter: 5' 6\" (1.676 m).    Weight as of this encounter: 190 lb 14.4 oz (86.6 kg).  Medication Reconciliation: complete   Gretel Fajardo MA  "

## 2018-04-04 NOTE — LETTER
April 4, 2018      Yarelis Leiva  [address] [address]        To Whom It May Concern:    Yarelis Leiva was seen in our clinic. Please excuse patient Yarelis from work due to acute illness until Tuesday 4/10/2018.      Sincerely,              Aaliyah Denise MD

## 2018-04-10 ENCOUNTER — MYC REFILL (OUTPATIENT)
Dept: FAMILY MEDICINE | Facility: CLINIC | Age: 48
End: 2018-04-10

## 2018-04-10 DIAGNOSIS — R05.9 COUGH: ICD-10-CM

## 2018-04-10 DIAGNOSIS — J06.9 UPPER RESPIRATORY TRACT INFECTION, UNSPECIFIED TYPE: ICD-10-CM

## 2018-04-10 RX ORDER — PREDNISONE 20 MG/1
20 TABLET ORAL 2 TIMES DAILY WITH MEALS
Qty: 10 TABLET | Refills: 1 | Status: CANCELLED | OUTPATIENT
Start: 2018-04-10

## 2018-04-11 NOTE — TELEPHONE ENCOUNTER
Message from Tupalot:  Original authorizing provider: MD Aundrea Colemanish LABOY Host would like a refill of the following medications:  predniSONE (DELTASONE) 20 MG tablet [Aaliyah Denise MD]    Preferred pharmacy: [place] [id number]    Comment:

## 2018-04-13 ENCOUNTER — MYC MEDICAL ADVICE (OUTPATIENT)
Dept: FAMILY MEDICINE | Facility: CLINIC | Age: 48
End: 2018-04-13

## 2018-04-13 ASSESSMENT — PATIENT HEALTH QUESTIONNAIRE - PHQ9
SUM OF ALL RESPONSES TO PHQ QUESTIONS 1-9: 3
10. IF YOU CHECKED OFF ANY PROBLEMS, HOW DIFFICULT HAVE THESE PROBLEMS MADE IT FOR YOU TO DO YOUR WORK, TAKE CARE OF THINGS AT HOME, OR GET ALONG WITH OTHER PEOPLE: SOMEWHAT DIFFICULT
SUM OF ALL RESPONSES TO PHQ QUESTIONS 1-9: 3

## 2018-04-14 ASSESSMENT — PATIENT HEALTH QUESTIONNAIRE - PHQ9: SUM OF ALL RESPONSES TO PHQ QUESTIONS 1-9: 3

## 2018-08-10 ENCOUNTER — TELEPHONE (OUTPATIENT)
Dept: FAMILY MEDICINE | Facility: CLINIC | Age: 48
End: 2018-08-10

## 2018-08-10 DIAGNOSIS — F41.8 DEPRESSION WITH ANXIETY: ICD-10-CM

## 2018-08-10 RX ORDER — ALPRAZOLAM 0.25 MG
0.25 TABLET ORAL 2 TIMES DAILY PRN
Qty: 30 TABLET | Refills: 0 | Status: CANCELLED | OUTPATIENT
Start: 2018-08-10

## 2019-06-20 DIAGNOSIS — F41.8 DEPRESSION WITH ANXIETY: ICD-10-CM

## 2019-06-21 RX ORDER — CITALOPRAM HYDROBROMIDE 20 MG/1
20 TABLET ORAL DAILY
Qty: 30 TABLET | Refills: 0 | Status: SHIPPED | OUTPATIENT
Start: 2019-06-21 | End: 2019-08-21

## 2019-06-21 NOTE — TELEPHONE ENCOUNTER
"Citalopram  Last Written Prescription Date:  04/04/2018  Last Fill Quantity: 90,  # refills: 3   Last office visit: 4/4/2018 with prescribing provider:     Future Office Visit:    Requested Prescriptions   Pending Prescriptions Disp Refills     citalopram (CELEXA) 20 MG tablet 90 tablet 3     Sig: Take 1 tablet (20 mg) by mouth daily       SSRIs Protocol Failed - 6/20/2019  3:51 PM        Failed - Recent (12 mo) or future (30 days) visit within the authorizing provider's specialty     Patient had office visit in the last 12 months or has a visit in the next 30 days with authorizing provider or within the authorizing provider's specialty.  See \"Patient Info\" tab in inbasket, or \"Choose Columns\" in Meds & Orders section of the refill encounter.              Passed - Medication is active on med list        Passed - Patient is age 18 or older        Passed - No active pregnancy on record        Passed - No positive pregnancy test in last 12 months          "

## 2019-08-21 DIAGNOSIS — F41.8 DEPRESSION WITH ANXIETY: ICD-10-CM

## 2019-08-21 RX ORDER — CITALOPRAM HYDROBROMIDE 20 MG/1
TABLET ORAL
Qty: 90 TABLET | Refills: 0 | Status: SHIPPED | OUTPATIENT
Start: 2019-08-21 | End: 2019-11-19

## 2019-08-21 ASSESSMENT — PATIENT HEALTH QUESTIONNAIRE - PHQ9: SUM OF ALL RESPONSES TO PHQ QUESTIONS 1-9: 4

## 2019-08-21 NOTE — TELEPHONE ENCOUNTER
Next 5 appointments (look out 90 days)    Oct 28, 2019  8:00 AM CDT  [facility] [address] [facility] [id number] [phone]        Medication filled 1 time as pt is due for a follow-up in clinic. Patient is already scheduled for upcoming appointment.    Mishel VILLAFUERTE RN

## 2019-08-21 NOTE — TELEPHONE ENCOUNTER
"  citalopram (CELEXA) 20 MG tablet 30 tablet 0 6/21/2019         Last Written Prescription Date:  06/21/2019  Last Fill Quantity: 30,  # refills: 0   Last office visit: 4/4/2018 with prescribing provider:   and  02/14/2017  Future Office Visit:  Spoke with pt, she state that she just got a new job. She will have insurance by October. She scheduled an appointment on 10/28/2019. She state that she only have 2 pills left. PHQ-9 was done over the phone.    PHQ-9 SCORE 2/14/2017 4/13/2018 8/21/2019   PHQ-9 Total Score MyChart - 3 (Minimal depression) -   PHQ-9 Total Score 11 3 4       Requested Prescriptions   Pending Prescriptions Disp Refills     citalopram (CELEXA) 20 MG tablet [Pharmacy Med Name: CITALOPRAM HYDROBROMIDE 20MG TABS] 30 tablet 0     Sig: TAKE ONE TABLET BY MOUTH EVERY DAY       SSRIs Protocol Failed - 8/21/2019  2:55 PM        Failed - Recent (12 mo) or future (30 days) visit within the authorizing provider's specialty     Patient had office visit in the last 12 months or has a visit in the next 30 days with authorizing provider or within the authorizing provider's specialty.  See \"Patient Info\" tab in inbasket, or \"Choose Columns\" in Meds & Orders section of the refill encounter.              Passed - Medication is active on med list        Passed - Patient is age 18 or older        Passed - No active pregnancy on record        Passed - No positive pregnancy test in last 12 months          "

## 2019-11-19 ENCOUNTER — OFFICE VISIT (OUTPATIENT)
Dept: FAMILY MEDICINE | Facility: CLINIC | Age: 49
End: 2019-11-19
Payer: COMMERCIAL

## 2019-11-19 VITALS
HEART RATE: 83 BPM | DIASTOLIC BLOOD PRESSURE: 82 MMHG | BODY MASS INDEX: 31.71 KG/M2 | SYSTOLIC BLOOD PRESSURE: 119 MMHG | TEMPERATURE: 98.6 F | HEIGHT: 67 IN | OXYGEN SATURATION: 95 % | WEIGHT: 202 LBS

## 2019-11-19 DIAGNOSIS — F41.8 DEPRESSION WITH ANXIETY: Primary | ICD-10-CM

## 2019-11-19 DIAGNOSIS — Z79.899 CONTROLLED SUBSTANCE AGREEMENT SIGNED: ICD-10-CM

## 2019-11-19 DIAGNOSIS — Z12.11 COLON CANCER SCREENING: ICD-10-CM

## 2019-11-19 DIAGNOSIS — Z13.0 SCREENING FOR DEFICIENCY ANEMIA: ICD-10-CM

## 2019-11-19 DIAGNOSIS — Z12.31 VISIT FOR SCREENING MAMMOGRAM: ICD-10-CM

## 2019-11-19 DIAGNOSIS — Z79.899 MEDICATION MANAGEMENT: ICD-10-CM

## 2019-11-19 DIAGNOSIS — Z13.220 SCREENING FOR LIPID DISORDERS: ICD-10-CM

## 2019-11-19 DIAGNOSIS — Z13.29 SCREENING FOR THYROID DISORDER: ICD-10-CM

## 2019-11-19 DIAGNOSIS — E55.9 VITAMIN D DEFICIENCY: ICD-10-CM

## 2019-11-19 LAB
ALBUMIN SERPL-MCNC: 4 G/DL (ref 3.4–5)
ALP SERPL-CCNC: 77 U/L (ref 40–150)
ALT SERPL W P-5'-P-CCNC: 30 U/L (ref 0–50)
ANION GAP SERPL CALCULATED.3IONS-SCNC: 7 MMOL/L (ref 3–14)
AST SERPL W P-5'-P-CCNC: 21 U/L (ref 0–45)
BILIRUB SERPL-MCNC: 0.6 MG/DL (ref 0.2–1.3)
BUN SERPL-MCNC: 18 MG/DL (ref 7–30)
CALCIUM SERPL-MCNC: 9.5 MG/DL (ref 8.5–10.1)
CHLORIDE SERPL-SCNC: 108 MMOL/L (ref 94–109)
CHOLEST SERPL-MCNC: 261 MG/DL
CO2 SERPL-SCNC: 25 MMOL/L (ref 20–32)
CREAT SERPL-MCNC: 0.78 MG/DL (ref 0.52–1.04)
ERYTHROCYTE [DISTWIDTH] IN BLOOD BY AUTOMATED COUNT: 14.3 % (ref 10–15)
GFR SERPL CREATININE-BSD FRML MDRD: 87 ML/MIN/{1.73_M2}
GLUCOSE SERPL-MCNC: 97 MG/DL (ref 70–99)
HCT VFR BLD AUTO: 40.2 % (ref 35–47)
HDLC SERPL-MCNC: 49 MG/DL
HGB BLD-MCNC: 13.1 G/DL (ref 11.7–15.7)
LDLC SERPL CALC-MCNC: 185 MG/DL
MCH RBC QN AUTO: 28.9 PG (ref 26.5–33)
MCHC RBC AUTO-ENTMCNC: 32.6 G/DL (ref 31.5–36.5)
MCV RBC AUTO: 89 FL (ref 78–100)
NONHDLC SERPL-MCNC: 212 MG/DL
PLATELET # BLD AUTO: 244 10E9/L (ref 150–450)
POTASSIUM SERPL-SCNC: 4 MMOL/L (ref 3.4–5.3)
PROT SERPL-MCNC: 7.4 G/DL (ref 6.8–8.8)
RBC # BLD AUTO: 4.54 10E12/L (ref 3.8–5.2)
SODIUM SERPL-SCNC: 140 MMOL/L (ref 133–144)
TRIGL SERPL-MCNC: 134 MG/DL
TSH SERPL DL<=0.005 MIU/L-ACNC: 2.04 MU/L (ref 0.4–4)
WBC # BLD AUTO: 6.5 10E9/L (ref 4–11)

## 2019-11-19 PROCEDURE — 84443 ASSAY THYROID STIM HORMONE: CPT | Performed by: INTERNAL MEDICINE

## 2019-11-19 PROCEDURE — 80053 COMPREHEN METABOLIC PANEL: CPT | Performed by: INTERNAL MEDICINE

## 2019-11-19 PROCEDURE — 36415 COLL VENOUS BLD VENIPUNCTURE: CPT | Performed by: INTERNAL MEDICINE

## 2019-11-19 PROCEDURE — 82306 VITAMIN D 25 HYDROXY: CPT | Performed by: INTERNAL MEDICINE

## 2019-11-19 PROCEDURE — 85027 COMPLETE CBC AUTOMATED: CPT | Performed by: INTERNAL MEDICINE

## 2019-11-19 PROCEDURE — 80061 LIPID PANEL: CPT | Performed by: INTERNAL MEDICINE

## 2019-11-19 PROCEDURE — 99214 OFFICE O/P EST MOD 30 MIN: CPT | Performed by: INTERNAL MEDICINE

## 2019-11-19 RX ORDER — ALPRAZOLAM 0.25 MG
0.25 TABLET ORAL DAILY PRN
Qty: 15 TABLET | Refills: 1 | Status: SHIPPED | OUTPATIENT
Start: 2019-11-19 | End: 2020-10-12

## 2019-11-19 RX ORDER — ALPRAZOLAM 0.25 MG
0.25 TABLET ORAL 2 TIMES DAILY PRN
Qty: 30 TABLET | Refills: 0 | Status: CANCELLED | OUTPATIENT
Start: 2019-11-19

## 2019-11-19 RX ORDER — CITALOPRAM HYDROBROMIDE 20 MG/1
10 TABLET ORAL DAILY
Qty: 45 TABLET | Refills: 3 | Status: SHIPPED | OUTPATIENT
Start: 2019-11-19 | End: 2020-02-03

## 2019-11-19 RX ORDER — OMEGA-3 FATTY ACIDS/FISH OIL 300-1000MG
200 CAPSULE ORAL EVERY 4 HOURS PRN
COMMUNITY
End: 2019-11-19

## 2019-11-19 ASSESSMENT — PATIENT HEALTH QUESTIONNAIRE - PHQ9
SUM OF ALL RESPONSES TO PHQ QUESTIONS 1-9: 3
5. POOR APPETITE OR OVEREATING: NOT AT ALL

## 2019-11-19 ASSESSMENT — ANXIETY QUESTIONNAIRES
5. BEING SO RESTLESS THAT IT IS HARD TO SIT STILL: SEVERAL DAYS
GAD7 TOTAL SCORE: 5
6. BECOMING EASILY ANNOYED OR IRRITABLE: SEVERAL DAYS
IF YOU CHECKED OFF ANY PROBLEMS ON THIS QUESTIONNAIRE, HOW DIFFICULT HAVE THESE PROBLEMS MADE IT FOR YOU TO DO YOUR WORK, TAKE CARE OF THINGS AT HOME, OR GET ALONG WITH OTHER PEOPLE: NOT DIFFICULT AT ALL
2. NOT BEING ABLE TO STOP OR CONTROL WORRYING: SEVERAL DAYS
3. WORRYING TOO MUCH ABOUT DIFFERENT THINGS: SEVERAL DAYS
1. FEELING NERVOUS, ANXIOUS, OR ON EDGE: SEVERAL DAYS
7. FEELING AFRAID AS IF SOMETHING AWFUL MIGHT HAPPEN: NOT AT ALL

## 2019-11-19 ASSESSMENT — MIFFLIN-ST. JEOR: SCORE: 1555.96

## 2019-11-19 NOTE — PATIENT INSTRUCTIONS
Labs today  You are due for mammogram.  Please call the following number to make appointment :  [phone]  It is located in [address]  Fit test   There is this is a new shingles vaccine available called shingrex  It is a series of 2 shots 2-6 months apart.  Considered more than 90% effective.  Please go to any pharmacy to get the  Vaccine    Avoid NSAIDS like ibuprofen , motrin and aleve with celexa as it increases the risk of GI bleeding.      Xanax  can be habit-forming. It should be taken as prescribed. Do not mix it  with alcohol. Be careful with driving.Do not loose the  Prescription.  Do not overuse this medication. It is a controlled substance.    Follow up in 6 months for physical

## 2019-11-19 NOTE — LETTER
[facility]  11/19/19    Patient: Yarelis Leiva  YOB: 1970  Medical Record Number: [id number]  CSN: [id number]                                                                              Non-opioid Controlled Substance Agreement    I understand that my care provider has prescribed a controlled substance to help manage my condition(s). I am taking this medicine to help me function or work. I know this is strong medicine, and that it can cause serious side effects. Controlled substances can be sedating, addicting and may cause a dependency on the drug. They can affect my ability to drive or think, and cause depression. They need to be taken exactly as prescribed. Combining controlled substances with certain medicines or chemicals (such as cocaine, sedatives and tranquilizers, sleeping pills, meth) can be dangerous or even fatal. Also, if I stop controlled substances suddenly, I may have severe withdrawal symptoms.  If not helpful, I may be asked to stop them.    The risks, benefits, and side effects of these medicine(s) were explained to me. I agree that:    1. I will take part in other treatments as advised by my care team. This may be psychiatry or counseling, physical therapy, behavioral therapy, group treatment or a referral to a pain clinic. I will reduce or stop my medicine when my care team tells me to do so.  2. I will take my medicines as prescribed. I will not change the dose or schedule unless my care team tells me to. There will be no refills if I  run out early.   I may be contactedwithout warning and asked to complete a urine drug test or pill count at any time.   3. I will keep all my appointments, and understand this is part of the monitoring of controlled substances. My care team may require an office visit for EVERY controlled substance refill. If I miss appointments or don t follow instructions, my care team may stop my medicine.  4. I will not ask other providers to  prescribe controlled substances, and I will not accept controlled substances from other people. If I need another prescribed controlled substance for a new reason, I will tell my care team within 1 business day.  5. I will use one pharmacy to fill all of my controlled substance prescriptions, and it is up to me to make sure that I do not run out of my medicines on weekends or holidays. If my care team is willing to refill my controlled substance prescription without a visit, I must request refills only during office hours, refills may take up to 3 days to process, and it may take up to 5 to 7 days for my medicine to be mailed and ready at my pharmacy. Prescriptions will not be mailed anywhere except my pharmacy.    6. I am responsible for my prescriptions. If the medicine/prescription is lost or stolen, it will not be replaced. I also agree not to share controlled substance medicines with anyone.              [facility]  11/19/19  Patient:  Yarelis Leiva  YOB: 1970  Medical Record Number: [id number]  CSN: [id number]    7. I agree to not use ANY illegal or recreational drugs. This includes marijuana, cocaine, bath salts or other drugs. I agree not to use alcohol unless my care team says I may. I agree to give urine samples whenever asked. If I don t give a urine sample, the care team may stop my medicine.    8. If I enroll in the [place] Medical Marijuana program, I will tell my care team. I will also sign an agreement to share my medical records with my care team.    9. I will bring in my list of medicines (or my medicine bottles) each time I come to the clinic.   10. I will tell my care team right away if I become pregnant or have a new medical problem treated outside of my regular clinic.  11. I understand that this medicine can affect my thinking and judgment. It may be unsafe for me to drive, use machinery and do dangerous tasks. I will not do any of these things until I know how the  medicine affects me. If my dose changes, I will wait to see how it affects me. I will contact my care team if I have concerns about medicine side effects.    I understand that if I do not follow any of the conditions above, my prescriptions or treatment may be stopped.      I agree that my provider, clinic care team, and pharmacy may work with any city, state or federal law enforcement agency that investigates the misuse, sale, or other diversion of my controlled medicine. I will allow my provider to discuss my care with or share a copy of this agreement with any other treating provider, pharmacy or emergency room where I receive care. I agree to give up (waive) any right of privacy or confidentiality with respect to these consents.   I have read this agreement and have asked questions about anything I did not understand.    ____________________________________________________    ____________  ________  Patient signature                                                         Date      Time    ____________________________________________________     ____________  ________  Witness                                                          Date      Time    ____________________________________________________  Provider signature

## 2019-11-19 NOTE — PROGRESS NOTES
"Jared SMITH Host is a 49 year old female who presents to clinic today for the following health issues:    HPI   {SUPERLIST (Optional):215368}  {additonal problems for provider to add (Optional):996878}    {HIST REVIEW/ LINKS 2 (Optional):529867}    Reviewed and updated as needed this visit by Provider         Review of Systems   {ROS COMP (Optional):970213}      Objective    There were no vitals taken for this visit.  There is no height or weight on file to calculate BMI.  Physical Exam   {Exam List (Optional):085969}    {Diagnostic Test Results (Optional):766689::\"Diagnostic Test Results:\",\"Labs reviewed in Epic\"}        {PROVIDER CHARTING PREFERENCE:108134}    "

## 2019-11-19 NOTE — PROGRESS NOTES
Jared Leiva is a 49 year old female who presents to clinic today for the following health issues:    HPI   Depression and Anxiety Follow-Up    How are you doing with your depression since your last visit? Improved     How are you doing with your anxiety since your last visit?  Improved     Are you having other symptoms that might be associated with depression or anxiety? No    Have you had a significant life event? No     Do you have any concerns with your use of alcohol or other drugs? No     Patient is using half tablet of citalopram so lasting longer  Working on healthy eating and regular physical activity  Go for yoga once a week  Had a hysterectomy so does not need Pap smear  Taking alprazolam very sparingly    Social History     Tobacco Use     Smoking status: Former Smoker     Smokeless tobacco: Never Used     Tobacco comment: SMOKED X 1 YR, QUIT OVER 20 YRS AGO   Substance Use Topics     Alcohol use: Yes     Alcohol/week: 0.0 standard drinks     Comment: 3 drinks per month     Drug use: No     PHQ 4/13/2018 8/21/2019 11/19/2019   PHQ-9 Total Score 3 4 3   Q9: Thoughts of better off dead/self-harm past 2 weeks Not at all Not at all Not at all     ROSE-7 SCORE 2/14/2017 2/14/2017 11/19/2019   Total Score 9 11 5     Last PHQ-9 11/19/2019   1.  Little interest or pleasure in doing things 0   2.  Feeling down, depressed, or hopeless 0   3.  Trouble falling or staying asleep, or sleeping too much 0   4.  Feeling tired or having little energy 1   5.  Poor appetite or overeating 1   6.  Feeling bad about yourself 1   7.  Trouble concentrating 0   8.  Moving slowly or restless 0   Q9: Thoughts of better off dead/self-harm past 2 weeks 0   PHQ-9 Total Score 3   Difficulty at work, home, or with people Not difficult at all     ROSE-7  11/19/2019   1. Feeling nervous, anxious, or on edge 1   2. Not being able to stop or control worrying 1   3. Worrying too much about different things 1   4. Trouble  relaxing 0   5. Being so restless that it is hard to sit still 1   6. Becoming easily annoyed or irritable 1   7. Feeling afraid, as if something awful might happen 0   ROSE-7 Total Score 5   If you checked any problems, how difficult have they made it for you to do your work, take care of things at home, or get along with other people? Not difficult at all     Declined for flu shot and hiv screening test     Suicide Assessment Five-step Evaluation and Treatment (SAFE-T)      How many servings of fruits and vegetables do you eat daily?  2-3    On average, how many sweetened beverages do you drink each day (soda, juice, sweet tea, etc)?   0    How many days per week do you miss taking your medication? 0        Patient Active Problem List   Diagnosis     Depression     Anxiety     Gallbladder disease     Controlled substance agreement signed     Hyperlipidemia, unspecified hyperlipidemia type     Past Surgical History:   Procedure Laterality Date     HEAD & NECK SURGERY      wisdom tooth extraction     HYSTERECTOMY TOTAL ABDOMINAL       HYSTERECTOMY, PAP NO LONGER INDICATED       LAPAROSCOPIC CHOLECYSTECTOMY N/A 9/23/2016    Procedure: LAPAROSCOPIC CHOLECYSTECTOMY;  Surgeon: Rory Davis MD;  Location: [facility]     ORTHOPEDIC SURGERY      fracture of radius and ulna     ORTHOPEDIC SURGERY      knee frcture        Social History     Tobacco Use     Smoking status: Former Smoker     Smokeless tobacco: Never Used     Tobacco comment: SMOKED X 1 YR, QUIT OVER 20 YRS AGO   Substance Use Topics     Alcohol use: Yes     Alcohol/week: 0.0 standard drinks     Comment: 3 drinks per month     Family History   Problem Relation Age of Onset     Thyroid Disease Father      Prostate Cancer Father      Diabetes Maternal Grandmother      Other Cancer Paternal Grandmother         BRAIN CANCER     Hypertension Other      Hypertension Mother      Colon Cancer No family hx of      Breast Cancer No family hx of      Colorectal Cancer No  "family hx of          Current Outpatient Medications   Medication Sig Dispense Refill     ALPRAZolam (XANAX) 0.25 MG tablet Take 1 tablet (0.25 mg) by mouth daily as needed for anxiety 15 tablet 1     citalopram (CELEXA) 20 MG tablet Take 0.5 tablets (10 mg) by mouth daily 45 tablet 3     fluticasone (FLONASE) 50 MCG/ACT spray USE ONE SPRAY IN EACH NOSTRIL EVERY DAY 16 g 3     Vitamin D, Cholecalciferol, 1000 UNITS CAPS Take 2,000 Units by mouth daily         Reviewed and updated as needed this visit by Provider         Review of Systems   ROS COMP: Constitutional, HEENT, cardiovascular, pulmonary, GI, , musculoskeletal, neuro, skin, endocrine and psych systems are negative, except as otherwise noted.      Objective    /82 (BP Location: Right arm, Cuff Size: Adult Large)   Pulse 83   Temp 98.6  F (37  C) (Tympanic)   Ht 1.689 m (5' 6.5\")   Wt 91.6 kg (202 lb)   SpO2 95%   BMI 32.12 kg/m    Body mass index is 32.12 kg/m .  Physical Exam       GENERAL APPEARANCE: healthy, alert and no distress  EYES: Eyes grossly normal to inspection, PERRL and conjunctivae and sclerae normal  HENT: ear canals and TM's normal and nose and mouth without ulcers or lesions  NECK: no adenopathy  RESP: lungs clear to auscultation - no rales, rhonchi or wheezes  CV: regular rates and rhythm, normal S1 S2, no S3          Assessment & Plan     Yarelis was seen today for recheck medication.    Diagnoses and all orders for this visit:    Depression with anxiety  -     citalopram (CELEXA) 20 MG tablet; Take 0.5 tablets (10 mg) by mouth daily  -     ALPRAZolam (XANAX) 0.25 MG tablet; Take 1 tablet (0.25 mg) by mouth daily as needed for anxiety  We offered 10 mg of citalopram but patient would like to continue 20  Educated her about alprazolam is side effects and precaution  Discussed with her about avoiding NSAIDs with that  Tylenol is okay to use for pain  Xanax can be habit-forming. It should be taken as prescribed. Do not mix it " " with alcohol. Be careful with driving.Do not loose the  Prescription.  Do not overuse this medication. It is a controlled substance.    Controlled substance agreement signed  She signed the treatment agreement for Xanax    Medication management  -     Comprehensive metabolic panel    Screening for lipid disorders  -     Lipid panel reflex to direct LDL Fasting    Screening for deficiency anemia  -     CBC with platelets    Visit for screening mammogram  -     MA Screen Bilateral w/Jim; Future    Screening for thyroid disorder  -     TSH with free T4 reflex    Vitamin D deficiency  -     Vitamin D Deficiency    Colon cancer screening  -     Fecal colorectal cancer screen (FIT); Future  We discussed colonoscopy patient chose fit test and will consider colonoscopy in future    Preventive health counseling was also done.  Informed about Shingrix  Discussed CDC guidelines for HIV test  She declined for flu shot and HIV test       BMI:   Estimated body mass index is 32.12 kg/m  as calculated from the following:    Height as of this encounter: 1.689 m (5' 6.5\").    Weight as of this encounter: 91.6 kg (202 lb).   Weight management plan: Discussed healthy diet and exercise guidelines        Patient Instructions   Labs today  You are due for mammogram.  Please call the following number to make appointment :  [phone]  It is located in [address]  Fit test   There is this is a new shingles vaccine available called shingrex  It is a series of 2 shots 2-6 months apart.  Considered more than 90% effective.  Please go to any pharmacy to get the  Vaccine    Avoid NSAIDS like ibuprofen , motrin and aleve with celexa as it increases the risk of GI bleeding.      Xanax  can be habit-forming. It should be taken as prescribed. Do not mix it  with alcohol. Be careful with driving.Do not loose the  Prescription.  Do not overuse this medication. It is a controlled substance.    Follow up in 6 months for physical            Return in " about 6 months (around 5/19/2020) for Physical Exam.    Deepti Ramirez MD  [facility]

## 2019-11-20 LAB — DEPRECATED CALCIDIOL+CALCIFEROL SERPL-MC: 68 UG/L (ref 20–75)

## 2019-11-20 ASSESSMENT — ANXIETY QUESTIONNAIRES: GAD7 TOTAL SCORE: 5

## 2019-11-20 NOTE — RESULT ENCOUNTER NOTE
Jovani Santoyo,    This is to inform you regarding your test result.    TSH which is thyroid hormone is normal.  Your total cholesterol is elevated.  HDL which is called good cholesterol is low.  Your LDL which is called bad cholesterol is elevated.  Eat low cholesterol low fat  diet and do regular physical activity.   If you would like to see a dietitian regarding this then please let us know so we can put a referral for you to see a dietitian.  The testing of your kidney function, liver function and electrolytes was satisfactory   CBC result which includes white count Hemoglobin and  Platelet Counts is normal.   Recheck lipid panel in 4-6 months.      Sincerely,      Dr.Nasima Ashley MD,FACP

## 2019-11-21 NOTE — RESULT ENCOUNTER NOTE
Jovani Santoyo,    This is to inform you regarding your test result.    Vitamin D level is normal.      Sincerely,      Dr.Nasima Ashley MD,FACP

## 2020-02-03 ENCOUNTER — OFFICE VISIT (OUTPATIENT)
Dept: FAMILY MEDICINE | Facility: CLINIC | Age: 50
End: 2020-02-03
Payer: COMMERCIAL

## 2020-02-03 VITALS
BODY MASS INDEX: 29.92 KG/M2 | OXYGEN SATURATION: 97 % | HEART RATE: 84 BPM | WEIGHT: 202 LBS | SYSTOLIC BLOOD PRESSURE: 119 MMHG | HEIGHT: 69 IN | TEMPERATURE: 97 F | DIASTOLIC BLOOD PRESSURE: 83 MMHG

## 2020-02-03 DIAGNOSIS — F41.8 DEPRESSION WITH ANXIETY: ICD-10-CM

## 2020-02-03 DIAGNOSIS — E78.5 HYPERLIPIDEMIA, UNSPECIFIED HYPERLIPIDEMIA TYPE: ICD-10-CM

## 2020-02-03 DIAGNOSIS — Z12.31 VISIT FOR SCREENING MAMMOGRAM: ICD-10-CM

## 2020-02-03 DIAGNOSIS — Z00.00 ROUTINE GENERAL MEDICAL EXAMINATION AT A HEALTH CARE FACILITY: Primary | ICD-10-CM

## 2020-02-03 DIAGNOSIS — Z12.11 COLON CANCER SCREENING: ICD-10-CM

## 2020-02-03 PROCEDURE — 99396 PREV VISIT EST AGE 40-64: CPT | Performed by: INTERNAL MEDICINE

## 2020-02-03 RX ORDER — CITALOPRAM HYDROBROMIDE 20 MG/1
10 TABLET ORAL DAILY
Qty: 45 TABLET | Refills: 3 | Status: SHIPPED | OUTPATIENT
Start: 2020-02-03 | End: 2021-03-09

## 2020-02-03 ASSESSMENT — MIFFLIN-ST. JEOR: SCORE: 1582.71

## 2020-02-03 NOTE — PROGRESS NOTES
SUBJECTIVE:   CC: Yarelis Leiva is an 50 year old woman who presents for preventive health visit.     Healthy Habits:    Getting at least 3 servings of Calcium per day:  Yes    Bi-annual eye exam:  NO    Dental care twice a year:  Yes    Sleep apnea or symptoms of sleep apnea:  None    Diet:  Regular (no restrictions)    Frequency of exercise:  2-3 days/week    Duration of exercise:  45-60 minutes    Taking medications regularly:  Not Applicable    Barriers to taking medications:  None    Medication side effects:  None    PHQ-2 Total Score:    Additional concerns today:  No      Patient is not fasting and was under the impression that cholesterol is only slightly high.    Today's PHQ-2 Score:   PHQ-2 ( 1999 Pfizer) 4/4/2018   Q1: Little interest or pleasure in doing things 0   Q2: Feeling down, depressed or hopeless 0   PHQ-2 Score 0       Abuse: Current or Past(Physical, Sexual or Emotional)- No  Do you feel safe in your environment? Yes        Social History     Tobacco Use     Smoking status: Former Smoker     Smokeless tobacco: Never Used     Tobacco comment: SMOKED X 1 YR, QUIT OVER 20 YRS AGO   Substance Use Topics     Alcohol use: Yes     Alcohol/week: 0.0 standard drinks     Comment: 3 drinks per month     If you drink alcohol do you typically have >3 drinks per day or >7 drinks per week? No    Alcohol Use 2/3/2020   Prescreen: >3 drinks/day or >7 drinks/week? No       Reviewed orders with patient.  Reviewed health maintenance and updated orders accordingly - Yes  Labs reviewed in EPIC    Due for mammogram    Pertinent mammograms are reviewed under the imaging tab.  History of abnormal Pap smear: Status post benign hysterectomy. Health Maintenance and Surgical History updated.     Reviewed and updated as needed this visit by clinical staff  Tobacco  Allergies  Meds  Problems  Med Hx  Surg Hx  Fam Hx  Soc Hx          Reviewed and updated as needed this visit by Provider  Problems       "      Review of Systems  CONSTITUTIONAL: NEGATIVE for fever, chills, change in weight  INTEGUMENTARY/SKIN: NEGATIVE for worrisome rashes, moles or lesions  EYES: NEGATIVE for vision changes or irritation  ENT: NEGATIVE for ear, mouth and throat problems  RESP: NEGATIVE for significant cough or SOB  BREAST: NEGATIVE for masses, tenderness or discharge  CV: NEGATIVE for chest pain, palpitations or peripheral edema  GI: NEGATIVE for nausea, abdominal pain, heartburn, or change in bowel habits  : NEGATIVE for unusual urinary or vaginal symptoms. No vaginal bleeding.  MUSCULOSKELETAL: NEGATIVE for significant arthralgias or myalgia  NEURO: NEGATIVE for weakness, dizziness or paresthesias  PSYCHIATRIC: NEGATIVE for changes in mood or affect      OBJECTIVE:   /83 (BP Location: Right arm, Patient Position: Chair, Cuff Size: Adult Large)   Pulse 84   Temp 97  F (36.1  C) (Oral)   Ht 1.74 m (5' 8.5\")   Wt 91.6 kg (202 lb)   SpO2 97%   Breastfeeding No   BMI 30.27 kg/m    Physical Exam  GENERAL: healthy, alert and no distress  EYES: Eyes grossly normal to inspection, PERRL and conjunctivae and sclerae normal  HENT: ear canals and TM's normal, nose and mouth without ulcers or lesions  NECK: no adenopathy, no asymmetry, masses, or scars and thyroid normal to palpation  RESP: lungs clear to auscultation - no rales, rhonchi or wheezes  BREAST: normal without masses, tenderness or nipple discharge and no palpable axillary masses or adenopathy  CV: regular rate and rhythm, normal S1 S2, no S3 or S4, no murmur, click or rub, slight  peripheral edema and peripheral pulses strong  ABDOMEN: soft, nontender, no hepatosplenomegaly, no masses and bowel sounds normal  He has a scar of gallbladder surgery  MS: no gross musculoskeletal defects noted, slight edema of lower extremity  SKIN: no suspicious lesions or rashes  Spider veis in both lower extremity causing slight edema  NEURO: Normal strength and tone, mentation intact " "and speech normal  PSYCH: mentation appears normal, affect normal/bright        ASSESSMENT/PLAN:   Yarelis was seen today for physical.    Diagnoses and all orders for this visit:    Routine general medical examination at a health care facility  Performed  Does not want to do colonoscopy  Have not done fit test yet  Due for Shingrix  Declined for flu shot    Colon cancer screening  She still has fit testing kit and would do it    Visit for screening mammogram  Overdue for mammogram  Discussed the importance of diet  She will schedule it    Depression with anxiety  -     citalopram (CELEXA) 20 MG tablet; Take 0.5 tablets (10 mg) by mouth daily  Current medication working well    Hyperlipidemia, unspecified hyperlipidemia type  -     Lipid panel reflex to direct LDL Fasting; Future  Educated that numbers are very high  Low-cholesterol low-fat diet  Recheck lipids in 2 to 3 months and if still high we will discuss the medication      COUNSELING:  Reviewed preventive health counseling, as reflected in patient instructions       Regular exercise       Healthy diet/nutrition    Estimated body mass index is 30.27 kg/m  as calculated from the following:    Height as of this encounter: 1.74 m (5' 8.5\").    Weight as of this encounter: 91.6 kg (202 lb).    Weight management plan: Discussed healthy diet and exercise guidelines     reports that she has quit smoking. She has never used smokeless tobacco.      Counseling Resources:  ATP IV Guidelines  Pooled Cohorts Equation Calculator  Breast Cancer Risk Calculator  FRAX Risk Assessment  ICSI Preventive Guidelines  Dietary Guidelines for Americans, 2010  USDA's MyPlate  ASA Prophylaxis  Lung CA Screening    Deepti Ramirez MD  [facility]  "

## 2020-02-03 NOTE — PROGRESS NOTES
"   SUBJECTIVE:   CC: Yarelis Leiva is an 50 year old woman who presents for preventive health visit.     Healthy Habits:    Do you get at least three servings of calcium containing foods daily (dairy, green leafy vegetables, etc.)? { :006540::\"yes\"}    Amount of exercise or daily activities, outside of work: { :758092}    Problems taking medications regularly { :702012::\"No\"}    Medication side effects: { :761632::\"No\"}    Have you had an eye exam in the past two years? { :299700}    Do you see a dentist twice per year? { :905321}    Do you have sleep apnea, excessive snoring or daytime drowsiness?{ :625006}  {Outside tests to abstract? :620730}    {additional problems to add (Optional):386973}    Today's PHQ-2 Score:   PHQ-2 ( 1999 Pfizer) 4/4/2018 9/12/2016   Q1: Little interest or pleasure in doing things 0 1   Q2: Feeling down, depressed or hopeless 0 3   PHQ-2 Score 0 4     {PHQ-2 LOOK IN ASSESSMENTS (Optional) :486786}  Abuse: Current or Past(Physical, Sexual or Emotional)- {YES/NO/NA:059588}  Do you feel safe in your environment? {YES/NO/NA:215056}        Social History     Tobacco Use     Smoking status: Former Smoker     Smokeless tobacco: Never Used     Tobacco comment: SMOKED X 1 YR, QUIT OVER 20 YRS AGO   Substance Use Topics     Alcohol use: Yes     Alcohol/week: 0.0 standard drinks     Comment: 3 drinks per month     If you drink alcohol do you typically have >3 drinks per day or >7 drinks per week? {ETOH :555719}                     Reviewed orders with patient.  Reviewed health maintenance and updated orders accordingly - {Yes/No:771001::\"Yes\"}  {Chronicprobdata (Optional):442902}    {Mammo Decision Support (Optional):751267}    Pertinent mammograms are reviewed under the imaging tab.  History of abnormal Pap smear: {PAP HX:253898}     Reviewed and updated as needed this visit by clinical staff  Tobacco  Allergies  Meds  Problems  Med Hx  Surg Hx  Fam Hx  Soc Hx          Reviewed and updated " "as needed this visit by Provider  Problems        {HISTORY OPTIONS (Optional):876285}    ROS:  { :035874}    OBJECTIVE:   /83 (BP Location: Right arm, Patient Position: Chair, Cuff Size: Adult Large)   Pulse 84   Temp 97  F (36.1  C) (Oral)   Ht 1.74 m (5' 8.5\")   Wt 91.6 kg (202 lb)   SpO2 97%   Breastfeeding No   BMI 30.27 kg/m    EXAM:  {Exam Choices:056881}    {Diagnostic Test Results (Optional):588957::\"Diagnostic Test Results:\",\"Labs reviewed in Epic\"}    ASSESSMENT/PLAN:   {Diag Picklist:381954}    COUNSELING:   {FEMALE COUNSELING MESSAGES:930153::\"Reviewed preventive health counseling, as reflected in patient instructions\"}    Estimated body mass index is 30.27 kg/m  as calculated from the following:    Height as of this encounter: 1.74 m (5' 8.5\").    Weight as of this encounter: 91.6 kg (202 lb).    {Weight Management Plan (ACO) Complete if BMI is abnormal-  Ages 18-64  BMI >24.9.  Age 65+ with BMI <23 or >30 (Optional):016057}     reports that she has quit smoking. She has never used smokeless tobacco.  {Tobacco Cessation -- Complete if patient is a smoker (Optional):968138}    Counseling Resources:  ATP IV Guidelines  Pooled Cohorts Equation Calculator  Breast Cancer Risk Calculator  FRAX Risk Assessment  ICSI Preventive Guidelines  Dietary Guidelines for Americans, 2010  USDA's MyPlate  ASA Prophylaxis  Lung CA Screening    Deepti Ramirez MD  [facility]  "

## 2020-02-03 NOTE — PATIENT INSTRUCTIONS
Schedule fasting labs in 2-3 months  You are due for mammogram.  Please call the following number to make appointment :  [phone]  It is located in [address]  Do fit test   There is this is a new shingles vaccine available called shingrex  It is a series of 2 shots 2-6 months apart.  Considered more than 90% effective.  Please go to any pharmacy to get the  vaccine    Follow up in one year for physical   Seek sooner medical attention if there is any worsening of symptoms or problems.      Preventive Health Recommendations  Female Ages 50 - 64    Yearly exam: See your health care provider every year in order to  o Review health changes.   o Discuss preventive care.    o Review your medicines if your doctor has prescribed any.      Get a Pap test every three years (unless you have an abnormal result and your provider advises testing more often).    If you get Pap tests with HPV test, you only need to test every 5 years, unless you have an abnormal result.     You do not need a Pap test if your uterus was removed (hysterectomy) and you have not had cancer.    You should be tested each year for STDs (sexually transmitted diseases) if you're at risk.     Have a mammogram every 1 to 2 years.    Have a colonoscopy at age 50, or have a yearly FIT test (stool test). These exams screen for colon cancer.      Have a cholesterol test every 5 years, or more often if advised.    Have a diabetes test (fasting glucose) every three years. If you are at risk for diabetes, you should have this test more often.     If you are at risk for osteoporosis (brittle bone disease), think about having a bone density scan (DEXA).    Shots: Get a flu shot each year. Get a tetanus shot every 10 years.    Nutrition:     Eat at least 5 servings of fruits and vegetables each day.    Eat whole-grain bread, whole-wheat pasta and brown rice instead of white grains and rice.    Get adequate Calcium and Vitamin D.     Lifestyle    Exercise at least  150 minutes a week (30 minutes a day, 5 days a week). This will help you control your weight and prevent disease.    Limit alcohol to one drink per day.    No smoking.     Wear sunscreen to prevent skin cancer.     See your dentist every six months for an exam and cleaning.    See your eye doctor every 1 to 2 years.

## 2020-02-18 ENCOUNTER — TELEPHONE (OUTPATIENT)
Dept: SCHEDULING | Facility: CLINIC | Age: 50
End: 2020-02-18

## 2020-02-19 NOTE — TELEPHONE ENCOUNTER
Per outreach, patient is aware of overdue screening and will call on their own time for scheduling.     Screening: Preventive Health Screening Mammogram      Patient also has FIT test that she is going to complete in place of a Colonoscopy  Thanks

## 2020-02-24 ENCOUNTER — ANCILLARY PROCEDURE (OUTPATIENT)
Dept: GENERAL RADIOLOGY | Facility: CLINIC | Age: 50
End: 2020-02-24
Attending: PHYSICIAN ASSISTANT
Payer: COMMERCIAL

## 2020-02-24 ENCOUNTER — OFFICE VISIT (OUTPATIENT)
Dept: URGENT CARE | Facility: URGENT CARE | Age: 50
End: 2020-02-24
Payer: COMMERCIAL

## 2020-02-24 VITALS
TEMPERATURE: 98 F | WEIGHT: 206 LBS | BODY MASS INDEX: 30.87 KG/M2 | RESPIRATION RATE: 18 BRPM | HEART RATE: 77 BPM | OXYGEN SATURATION: 97 % | DIASTOLIC BLOOD PRESSURE: 67 MMHG | SYSTOLIC BLOOD PRESSURE: 106 MMHG

## 2020-02-24 DIAGNOSIS — R05.9 COUGH: ICD-10-CM

## 2020-02-24 DIAGNOSIS — J06.9 VIRAL URI WITH COUGH: Primary | ICD-10-CM

## 2020-02-24 PROCEDURE — 99213 OFFICE O/P EST LOW 20 MIN: CPT | Performed by: PHYSICIAN ASSISTANT

## 2020-02-24 PROCEDURE — 71046 X-RAY EXAM CHEST 2 VIEWS: CPT

## 2020-02-24 RX ORDER — BENZONATATE 200 MG/1
200 CAPSULE ORAL 3 TIMES DAILY PRN
Qty: 20 CAPSULE | Refills: 0 | Status: SHIPPED | OUTPATIENT
Start: 2020-02-24 | End: 2021-03-21

## 2020-02-25 NOTE — PROGRESS NOTES
SUBJECTIVE:   Yarelis Leiva is a 50 year old female presenting with a chief complaint of cough - productive.  Onset of symptoms was 5 day(s) ago.  Course of illness is same.    Severity moderate  Current and Associated symptoms: as above. She has had fever for the last several days.   Treatment measures tried include Tylenol/Ibuprofen.  Predisposing factors include co workers with similar symptoms.    Past Medical History:   Diagnosis Date     Anxiety      Broken arm     radius/ humerus     Depression      Fibroid uterus      Gallstones      H/O seasonal allergies      Current Outpatient Medications   Medication Sig Dispense Refill     ALPRAZolam (XANAX) 0.25 MG tablet Take 1 tablet (0.25 mg) by mouth daily as needed for anxiety 15 tablet 1     benzonatate (TESSALON) 200 MG capsule Take 1 capsule (200 mg) by mouth 3 times daily as needed for cough 20 capsule 0     citalopram (CELEXA) 20 MG tablet Take 0.5 tablets (10 mg) by mouth daily 45 tablet 3     fluticasone (FLONASE) 50 MCG/ACT spray USE ONE SPRAY IN EACH NOSTRIL EVERY DAY 16 g 3     Vitamin D, Cholecalciferol, 1000 UNITS CAPS Take 2,000 Units by mouth daily       Social History     Tobacco Use     Smoking status: Former Smoker     Smokeless tobacco: Never Used     Tobacco comment: SMOKED X 1 YR, QUIT OVER 20 YRS AGO   Substance Use Topics     Alcohol use: Yes     Alcohol/week: 0.0 standard drinks     Comment: 3 drinks per month       ROS:  Review of systems negative except as stated above.    OBJECTIVE:  /67   Pulse 77   Temp 98  F (36.7  C) (Tympanic)   Resp 18   Wt 93.4 kg (206 lb)   SpO2 97%   BMI 30.87 kg/m    GENERAL APPEARANCE: healthy, alert and no distress  EYES: EOMI,  PERRL, conjunctiva clear  HENT: ear canals and TM's normal.  Nose and mouth without ulcers, erythema or lesions  NECK: supple, nontender, no lymphadenopathy  RESP: lungs clear to auscultation - no rales, rhonchi or wheezes  CV: regular rates and rhythm, normal S1 S2, no  murmur noted  NEURO: Normal strength and tone, sensory exam grossly normal,  normal speech and mentation  SKIN: no suspicious lesions or rashes    CXR --  No evidence for pneumonia    ASSESSMENT / PLAN:  1. Viral URI with cough  Lungs are CTAB, no sign of respiratory distress. Throat without PTA or RPA and TM clear B/L.  CXR is clear  Encouraged fluids, rest and humidified air at night  OK to take mucinex DM for cough as well     - XR Chest 2 Views; Future  - benzonatate (TESSALON) 200 MG capsule; Take 1 capsule (200 mg) by mouth 3 times daily as needed for cough  Dispense: 20 capsule; Refill: 0    Diagnosis and treatment plan was reviewed with patient and/or family.   We went over any labs or imaging. Discussed worsening symptoms or little to no relief despite treatment plan to follow-up with PCP or return to clinic.  Patient verbalizes understanding. All questions were addressed and answered.   Radha Sutton PA-C

## 2020-03-10 ENCOUNTER — HEALTH MAINTENANCE LETTER (OUTPATIENT)
Age: 50
End: 2020-03-10

## 2020-10-09 DIAGNOSIS — F41.8 DEPRESSION WITH ANXIETY: ICD-10-CM

## 2020-10-10 NOTE — TELEPHONE ENCOUNTER
Disp Refills Start End TRACIE   ALPRAZolam (XANAX) 0.25 MG tablet 15 tablet 1 11/19/2019  No   Sig - Route: Take 1 tablet (0.25 mg) by mouth daily as needed for anxiety - Oral

## 2020-10-12 NOTE — TELEPHONE ENCOUNTER
Second request  Patient needs VV for controlled substance follow up  Fasting labs still due    Will send RT Melyssa (R)

## 2020-10-12 NOTE — TELEPHONE ENCOUNTER
: Please schedule patient for Virtual Visit. Document if does not have enough med to get to this appointment. Then--route back to triage so we can notify pharmacy and either deny or refill the request.   Thank you,  Nicole PURCELL RN,BSN

## 2020-10-13 RX ORDER — ALPRAZOLAM 0.25 MG
0.25 TABLET ORAL DAILY PRN
Qty: 15 TABLET | Refills: 0 | Status: SHIPPED | OUTPATIENT
Start: 2020-10-13

## 2020-10-13 NOTE — TELEPHONE ENCOUNTER
Routing refill request to provider for review/approval because:  Drug not on the [facility] refill protocol     Last visit 2/3/2020, follow up specified as 1 year     Mishel VILLAFUERTE RN

## 2020-11-12 ENCOUNTER — HOSPITAL ENCOUNTER (OUTPATIENT)
Dept: MAMMOGRAPHY | Facility: CLINIC | Age: 50
Discharge: HOME | End: 2020-11-12
Attending: INTERNAL MEDICINE | Admitting: INTERNAL MEDICINE
Payer: COMMERCIAL

## 2020-11-12 DIAGNOSIS — Z12.31 VISIT FOR SCREENING MAMMOGRAM: ICD-10-CM

## 2020-11-12 PROCEDURE — 77063 BREAST TOMOSYNTHESIS BI: CPT

## 2020-11-12 NOTE — RESULT ENCOUNTER NOTE
Jovani Santoyo,    This is to inform you regarding your test result.    Mammogram result is satisfactory .  Recommendation: annual mammography      Sincerely,      Dr.Nasima Ashley MD,FACP

## 2020-12-27 ENCOUNTER — HEALTH MAINTENANCE LETTER (OUTPATIENT)
Age: 50
End: 2020-12-27

## 2021-03-06 ENCOUNTER — HEALTH MAINTENANCE LETTER (OUTPATIENT)
Age: 51
End: 2021-03-06

## 2021-03-08 DIAGNOSIS — F41.8 DEPRESSION WITH ANXIETY: ICD-10-CM

## 2021-03-09 RX ORDER — CITALOPRAM HYDROBROMIDE 20 MG/1
TABLET ORAL
Qty: 15 TABLET | Refills: 0 | Status: SHIPPED | OUTPATIENT
Start: 2021-03-09

## 2021-03-09 NOTE — TELEPHONE ENCOUNTER
PHQ 4/13/2018 8/21/2019 11/19/2019   PHQ-9 Total Score 3 4 3   Q9: Thoughts of better off dead/self-harm past 2 weeks Not at all Not at all Not at all     Medication filled 1 time as pt is due for a follow-up in clinic. My chart has been sent to patient notifying to schedule appointment.

## 2021-03-21 ENCOUNTER — OFFICE VISIT (OUTPATIENT)
Dept: URGENT CARE | Facility: URGENT CARE | Age: 51
End: 2021-03-21
Payer: COMMERCIAL

## 2021-03-21 VITALS
SYSTOLIC BLOOD PRESSURE: 116 MMHG | HEART RATE: 83 BPM | BODY MASS INDEX: 31.99 KG/M2 | TEMPERATURE: 97.2 F | WEIGHT: 213.5 LBS | OXYGEN SATURATION: 96 % | DIASTOLIC BLOOD PRESSURE: 78 MMHG

## 2021-03-21 DIAGNOSIS — R21 RASH AND NONSPECIFIC SKIN ERUPTION: Primary | ICD-10-CM

## 2021-03-21 PROCEDURE — 99213 OFFICE O/P EST LOW 20 MIN: CPT | Performed by: FAMILY MEDICINE

## 2021-03-21 RX ORDER — PREDNISONE 20 MG/1
40 TABLET ORAL DAILY
Qty: 14 TABLET | Refills: 0 | Status: SHIPPED | OUTPATIENT
Start: 2021-03-21 | End: 2021-03-28

## 2021-03-21 NOTE — PROGRESS NOTES
Assessment & Plan     Rash and nonspecific skin eruption  Differentials discussed in detail including allergic etiology.  Prednisone prescribed, common side effects discussed.  Recommended to continue over-the-counter antihistamine and well hydration.  Suggested to follow-up with dermatology or PCP if symptoms persist.  Patient understood and agreed with our plan.  All questions answered.   - predniSONE (DELTASONE) 20 MG tablet; Take 2 tablets (40 mg) by mouth daily for 7 days  - DERMATOLOGY ADULT REFERRAL; Future       Patient Instructions     Patient Education     General Allergic Reactions  An allergic reaction is a set of symptoms caused by an allergen. An allergen is something that causes your immune system to react abnormally. It releases various chemicals. These include histamine. Histamine causes swelling and itching. An allergic reaction may affect the entire body. This is called a general allergic reaction. Often symptoms affect only one part of the body. This is called a local allergic reaction.   You are having an allergic reaction. Almost anything can cause one. Different people are allergic to different things. It is usually something that you ate or swallowed, came into contact with by getting or putting it on your skin or clothes, or something you breathed in the air. This can be very annoying and sometimes scary.   Most people think of allergic reactions when they have a rash or itchy skin. Other symptoms can include:     Itching of the eyes, nose, and roof of the mouth    Runny or stuffy nose    Watery eyes     Sneezing or coughing     A blocked feeling in the ear    Red, raised, itchy rash called hives    Red and purple spots    Rash, redness, welts, blisters    Itching, burning, stinging, pain    Dry, flaky, cracking, scaly skin  Severe symptoms include:    Swelling of the face, lips, or other parts of the body    Hoarse voice    Trouble swallowing, feeling like your throat is  closing    Trouble breathing, wheezing    Nausea, vomiting, diarrhea, stomach cramps    Feeling faint or lightheaded, rapid heart rate  Sometimes the cause may be obvious. But there are so many things that can cause a reaction that you may not be able to figure it out. The most important things to help find your allergen are to remember:     When it started    What you were doing at the time or just before that    What activities you were involved in    If you were exposed to anything new  Below are some common causes of allergies. Some of these can cause severe general allergic reactions. Others can cause mild to moderate symptoms. But remember that almost anything can cause a reaction. You may not even be aware that you came into contact with one of these things:     Dust, mold, pollen    Plants (common ones are poison ivy and poison oak, but there are many others)     Animals    Foods such as shrimp, shellfish, peanuts, milk products, gluten, and eggs    Food colorings, flavorings, and additives    Insect bites or stings such as bees, mosquitoes, fleas, and ticks    Medicines such as penicillin, sulfa medicines, aspirin, and ibuprofen. But any medicine can cause a reaction.    Jewelry such as nickel or gold. This can be new, or something you ve worn for a while, including zippers and buttons.    Latex such as in gloves, clothes, toys, balloons, or some tapes. Some people allergic to latex may also have problems with foods like bananas, avocados, kiwi, papaya, or chestnuts.    Lotions, perfumes, cosmetics, soaps, shampoos, skincare products, nail products    Chemicals or dyes in clothing, linen, , hair dyes, soaps, iodine  Many viruses and common colds can cause a rash that is not an allergic reaction. Sometimes it is hard to tell the difference between allergies, sensitivity, or an intolerance to something. This is especially true with food. Many things can cause diarrhea, vomiting, stomach cramps, and  skin irritation.   Home care    The goal of treatment is to help relieve the symptoms and get you feeling better. The rash will usually fade over several days. But it can sometimes last a couple of weeks. Over the next couple of days, there may be times when it gets a little worse, and then better again. Here are some things to do:     If you know what you are allergic to, stay away from it. Future exposures may cause similar or sometimes worse symptoms.    Don't wear tight clothing and stay away from anything that heats up your skin such as hot showers or baths, and direct sunlight. Heat will make itching worse.    An ice pack will relieve local areas of intense itching and redness. To make an ice pack, put ice cubes in a plastic bag that seals at the top. Wrap it in a thin, clean towel. Don t put the ice directly on the skin because it can damage the skin.    Oral diphenhydramine is an over-the-counter antihistamine sold at pharmacies and grocery stores. Unless a prescription antihistamine was given, diphenhydramine may be used to reduce itching if large areas of the skin are involved. It may make you sleepy. So be careful using it in the daytime or when going to school, working, or driving. Note: Don t use diphenhydramine if you have glaucoma or if you are a man with trouble urinating because of an enlarged prostate. There are other antihistamines that won t make you so sleepy. These are good choices for daytime use. Ask your healthcare provider or pharmacist for suggestions.    Don t use diphenhydramine cream on your skin unless prescribed. It may cause a worse reaction in some people.    To help prevent an infection, don't scratch the affected area. Scratching may worsen the reaction and damage your skin. It can also lead to an infection. Always check the affected areas for signs of an infection.    Call your healthcare provider and ask what you can use to help decrease the itching.    To decrease your exposure  to allergens, try the following:    ? Use heat-steam to clean your home.  ? Use high-efficiency particulate (HEPA) vacuums and filters.  ? Stay away from food and pet triggers.  ? Kill any cockroaches and use pest control to keep further infestations from happening.  ? Clean your house often.  Follow-up care  Follow up with your healthcare provider, or as advised. If you had a severe reaction today, or if you have had several mild to medium allergic reactions in the past, ask your provider about allergy testing. This can help you find out what you are allergic to. If you had a severe reaction that included dizziness, fainting, or trouble breathing or swallowing, ask your provider about carrying auto-injectable epinephrine.   Call 911  Call 911 if any of these occur:     Trouble breathing or swallowing, wheezing    Cool, moist, pale skin    Shortness of breath    Hoarse voice or trouble speaking    Confusion     Very drowsy or trouble awakening    Fainting or loss of consciousness    Rapid heart rate    Feeling of dizziness or weakness or a sudden drop in blood pressure    Feeling of doom    Feeling lightheaded    Severe nausea or vomiting, or diarrhea    Seizure    Swelling in the face, eyelids, lips, mouth, throat, or tongue    Drooling  When to seek medical advice  Call your healthcare provider or get medical care right away if any of these occur:     Spreading areas of itching, redness, or swelling    Nausea or stomach cramps or abdominal pain    Continuing or recurring symptoms    Spreading areas of redness, swelling, or itching    Signs of infection at the affected site:  ? Spreading redness  ? Increased pain or swelling  ? Fluid or colored drainage from the site  ? Fever of 100.4 F (38 C) or above lasting for 24 to 48 hours, or as directed by your provider  Dmitry last reviewed this educational content on 10/1/2019    7639-8477 [employer]. All rights reserved. This information is not intended  as a substitute for professional medical care. Always follow your healthcare professional's instructions.                 Jovanni Hemphill MD  [facility] Santoyo is a 51 year old who presents for the following health issues     HPI     Rash  Onset/Duration: Friday   Description  Location: all over,   Character: raised  Itching: moderate  Intensity:  moderate  Progression of Symptoms:  worsening  Accompanying signs and symptoms:   Fever: no  Body aches or joint pain: no  Sore throat symptoms: no  Recent cold symptoms: no  History:           Previous episodes of similar rash: None  New exposures:  None  Recent travel: no  Exposure to similar rash: no  Therapies tried and outcome: Benadryl/diphenhydramine -  not effective    rash thinks its shingles, forehead, neck chest feet and legs, Friday woke up and thought it was hives and not hives. yesterday was more red, took benedryl and didnt help and got worse today       Review of Systems   Constitutional, HEENT, cardiovascular, pulmonary, gi and gu systems are negative, except as otherwise noted.      Objective    /78   Pulse 83   Temp 97.2  F (36.2  C)   Wt 96.8 kg (213 lb 8 oz)   SpO2 96%   BMI 31.99 kg/m    Body mass index is 31.99 kg/m .  Physical Exam   GENERAL: alert and no distress  EYES: Eyes grossly normal to inspection, PERRL and conjunctivae and sclerae normal  HENT: ear canals and TM's normal, nose and mouth without ulcers or lesions  NECK: no adenopathy, no asymmetry, masses, or scars and thyroid normal to palpation  RESP: lungs clear to auscultation - no rales, rhonchi or wheezes  CV: regular rate and rhythm, normal S1 S2, no S3 or S4, no murmur, click or rub, no peripheral edema and peripheral pulses strong  ABDOMEN: soft, nontender, no hepatosplenomegaly, no masses and bowel sounds normal  MS: no gross musculoskeletal defects noted, no edema  SKIN: maculopapular light erythematous rashes involving face,  trunk, upper and lower extremities as shown below  NEURO: Normal strength and tone, mentation intact and speech normal  PSYCH: mentation appears normal, affect normal/bright      Left leg       Frontal scalp/forehead       Left neck       Right forearm/hand

## 2021-03-21 NOTE — PATIENT INSTRUCTIONS
Patient Education     General Allergic Reactions  An allergic reaction is a set of symptoms caused by an allergen. An allergen is something that causes your immune system to react abnormally. It releases various chemicals. These include histamine. Histamine causes swelling and itching. An allergic reaction may affect the entire body. This is called a general allergic reaction. Often symptoms affect only one part of the body. This is called a local allergic reaction.   You are having an allergic reaction. Almost anything can cause one. Different people are allergic to different things. It is usually something that you ate or swallowed, came into contact with by getting or putting it on your skin or clothes, or something you breathed in the air. This can be very annoying and sometimes scary.   Most people think of allergic reactions when they have a rash or itchy skin. Other symptoms can include:     Itching of the eyes, nose, and roof of the mouth    Runny or stuffy nose    Watery eyes     Sneezing or coughing     A blocked feeling in the ear    Red, raised, itchy rash called hives    Red and purple spots    Rash, redness, welts, blisters    Itching, burning, stinging, pain    Dry, flaky, cracking, scaly skin  Severe symptoms include:    Swelling of the face, lips, or other parts of the body    Hoarse voice    Trouble swallowing, feeling like your throat is closing    Trouble breathing, wheezing    Nausea, vomiting, diarrhea, stomach cramps    Feeling faint or lightheaded, rapid heart rate  Sometimes the cause may be obvious. But there are so many things that can cause a reaction that you may not be able to figure it out. The most important things to help find your allergen are to remember:     When it started    What you were doing at the time or just before that    What activities you were involved in    If you were exposed to anything new  Below are some common causes of allergies. Some of these can cause severe  general allergic reactions. Others can cause mild to moderate symptoms. But remember that almost anything can cause a reaction. You may not even be aware that you came into contact with one of these things:     Dust, mold, pollen    Plants (common ones are poison ivy and poison oak, but there are many others)     Animals    Foods such as shrimp, shellfish, peanuts, milk products, gluten, and eggs    Food colorings, flavorings, and additives    Insect bites or stings such as bees, mosquitoes, fleas, and ticks    Medicines such as penicillin, sulfa medicines, aspirin, and ibuprofen. But any medicine can cause a reaction.    Jewelry such as nickel or gold. This can be new, or something you ve worn for a while, including zippers and buttons.    Latex such as in gloves, clothes, toys, balloons, or some tapes. Some people allergic to latex may also have problems with foods like bananas, avocados, kiwi, papaya, or chestnuts.    Lotions, perfumes, cosmetics, soaps, shampoos, skincare products, nail products    Chemicals or dyes in clothing, linen, , hair dyes, soaps, iodine  Many viruses and common colds can cause a rash that is not an allergic reaction. Sometimes it is hard to tell the difference between allergies, sensitivity, or an intolerance to something. This is especially true with food. Many things can cause diarrhea, vomiting, stomach cramps, and skin irritation.   Home care    The goal of treatment is to help relieve the symptoms and get you feeling better. The rash will usually fade over several days. But it can sometimes last a couple of weeks. Over the next couple of days, there may be times when it gets a little worse, and then better again. Here are some things to do:     If you know what you are allergic to, stay away from it. Future exposures may cause similar or sometimes worse symptoms.    Don't wear tight clothing and stay away from anything that heats up your skin such as hot showers or baths,  and direct sunlight. Heat will make itching worse.    An ice pack will relieve local areas of intense itching and redness. To make an ice pack, put ice cubes in a plastic bag that seals at the top. Wrap it in a thin, clean towel. Don t put the ice directly on the skin because it can damage the skin.    Oral diphenhydramine is an over-the-counter antihistamine sold at pharmacies and grocery stores. Unless a prescription antihistamine was given, diphenhydramine may be used to reduce itching if large areas of the skin are involved. It may make you sleepy. So be careful using it in the daytime or when going to school, working, or driving. Note: Don t use diphenhydramine if you have glaucoma or if you are a man with trouble urinating because of an enlarged prostate. There are other antihistamines that won t make you so sleepy. These are good choices for daytime use. Ask your healthcare provider or pharmacist for suggestions.    Don t use diphenhydramine cream on your skin unless prescribed. It may cause a worse reaction in some people.    To help prevent an infection, don't scratch the affected area. Scratching may worsen the reaction and damage your skin. It can also lead to an infection. Always check the affected areas for signs of an infection.    Call your healthcare provider and ask what you can use to help decrease the itching.    To decrease your exposure to allergens, try the following:    ? Use heat-steam to clean your home.  ? Use high-efficiency particulate (HEPA) vacuums and filters.  ? Stay away from food and pet triggers.  ? Kill any cockroaches and use pest control to keep further infestations from happening.  ? Clean your house often.  Follow-up care  Follow up with your healthcare provider, or as advised. If you had a severe reaction today, or if you have had several mild to medium allergic reactions in the past, ask your provider about allergy testing. This can help you find out what you are allergic  to. If you had a severe reaction that included dizziness, fainting, or trouble breathing or swallowing, ask your provider about carrying auto-injectable epinephrine.   Call 911  Call 911 if any of these occur:     Trouble breathing or swallowing, wheezing    Cool, moist, pale skin    Shortness of breath    Hoarse voice or trouble speaking    Confusion     Very drowsy or trouble awakening    Fainting or loss of consciousness    Rapid heart rate    Feeling of dizziness or weakness or a sudden drop in blood pressure    Feeling of doom    Feeling lightheaded    Severe nausea or vomiting, or diarrhea    Seizure    Swelling in the face, eyelids, lips, mouth, throat, or tongue    Drooling  When to seek medical advice  Call your healthcare provider or get medical care right away if any of these occur:     Spreading areas of itching, redness, or swelling    Nausea or stomach cramps or abdominal pain    Continuing or recurring symptoms    Spreading areas of redness, swelling, or itching    Signs of infection at the affected site:  ? Spreading redness  ? Increased pain or swelling  ? Fluid or colored drainage from the site  ? Fever of 100.4 F (38 C) or above lasting for 24 to 48 hours, or as directed by your provider  Dmitry last reviewed this educational content on 10/1/2019    9409-2267 [employer]. All rights reserved. This information is not intended as a substitute for professional medical care. Always follow your healthcare professional's instructions.

## 2021-10-04 ENCOUNTER — HEALTH MAINTENANCE LETTER (OUTPATIENT)
Age: 51
End: 2021-10-04

## 2022-01-23 ENCOUNTER — HEALTH MAINTENANCE LETTER (OUTPATIENT)
Age: 52
End: 2022-01-23

## 2022-03-20 ENCOUNTER — HEALTH MAINTENANCE LETTER (OUTPATIENT)
Age: 52
End: 2022-03-20

## 2022-08-26 ENCOUNTER — APPOINTMENT (OUTPATIENT)
Dept: ULTRASOUND IMAGING | Facility: CLINIC | Age: 52
End: 2022-08-26
Attending: EMERGENCY MEDICINE
Payer: COMMERCIAL

## 2022-08-26 ENCOUNTER — HOSPITAL ENCOUNTER (EMERGENCY)
Facility: CLINIC | Age: 52
Discharge: HOME | End: 2022-08-26
Attending: EMERGENCY MEDICINE | Admitting: EMERGENCY MEDICINE
Payer: COMMERCIAL

## 2022-08-26 VITALS
OXYGEN SATURATION: 100 % | RESPIRATION RATE: 18 BRPM | BODY MASS INDEX: 29.73 KG/M2 | DIASTOLIC BLOOD PRESSURE: 80 MMHG | TEMPERATURE: 98 F | WEIGHT: 185 LBS | HEART RATE: 82 BPM | HEIGHT: 66 IN | SYSTOLIC BLOOD PRESSURE: 144 MMHG

## 2022-08-26 DIAGNOSIS — I80.01 THROMBOPHLEBITIS OF SUPERFICIAL VEINS OF RIGHT LOWER EXTREMITY: ICD-10-CM

## 2022-08-26 PROCEDURE — 93971 EXTREMITY STUDY: CPT | Mod: RT

## 2022-08-26 PROCEDURE — 99284 EMERGENCY DEPT VISIT MOD MDM: CPT | Mod: 25

## 2022-08-26 RX ORDER — CEPHALEXIN 500 MG/1
500 CAPSULE ORAL 3 TIMES DAILY
Qty: 21 CAPSULE | Refills: 0 | Status: SHIPPED | OUTPATIENT
Start: 2022-08-26 | End: 2022-09-02

## 2022-08-26 ASSESSMENT — ACTIVITIES OF DAILY LIVING (ADL): ADLS_ACUITY_SCORE: 35

## 2022-08-26 ASSESSMENT — ENCOUNTER SYMPTOMS
COLOR CHANGE: 1
ARTHRALGIAS: 1
MYALGIAS: 1

## 2022-08-26 NOTE — ED PROVIDER NOTES
History   Chief Complaint:  Leg Pain     The history is provided by the patient.      Yarelis Leiva is a 52 year old female with a family history of DVT in her father (who smoked and had cancer) who presents with leg pain. The patient reports she bumped her right leg last night, but it was not tender at that time then this morning she awoke with a stiff right knee and knee pain that migrated to her upper right leg. She endorses right leg redness and swelling. The patient reports no other injury to her leg yesterday and did no strenuous activities. She states her dad had a DVT in old age, but notes he also was sedentary, smoked, and had a history of cancer.     Review of Systems   Cardiovascular: Positive for leg swelling (R leg).   Musculoskeletal: Positive for arthralgias (R knee) and myalgias (R upper leg pain).   Skin: Positive for color change (red line on lower R leg).   All other systems reviewed and are negative.    Allergies:  Cats  Seasonal Allergies    Medications:  ALPRAZolam (XANAX) 0.25 MG tablet  citalopram (CELEXA) 20 MG tablet  fluticasone (FLONASE) 50 MCG/ACT spray  Vitamin D, Cholecalciferol, 1000 UNITS CAPS    Past Medical History:     Patient Active Problem List   Diagnosis     Depression     Anxiety     Gallbladder disease     Controlled substance agreement signed     Hyperlipidemia, unspecified hyperlipidemia type        Past Surgical History:    Past Surgical History:   Procedure Laterality Date     HEAD & NECK SURGERY      wisdom tooth extraction     HYSTERECTOMY TOTAL ABDOMINAL       HYSTERECTOMY, PAP NO LONGER INDICATED       LAPAROSCOPIC CHOLECYSTECTOMY N/A 9/23/2016    Procedure: LAPAROSCOPIC CHOLECYSTECTOMY;  Surgeon: Rory Davis MD;  Location: [facility]     ORTHOPEDIC SURGERY      fracture of radius and ulna     ORTHOPEDIC SURGERY      knee frcture         Family History:    Family History   Problem Relation Age of Onset     Thyroid Disease Father      Prostate Cancer Father       "Diabetes Maternal Grandmother      Other Cancer Paternal Grandmother         BRAIN CANCER     Hypertension Other      Hypertension Mother      Colon Cancer No family hx of      Breast Cancer No family hx of      Colorectal Cancer No family hx of        Social History:  The patient presents to the ED alone  PCP: Deepti Ramirez   Works from home    Physical Exam     Patient Vitals for the past 24 hrs:   BP Temp Temp src Pulse Resp SpO2 Height Weight   08/26/22 1747 (!) 144/80 98  F (36.7  C) Temporal 82 18 100 % 1.676 m (5' 6\") 83.9 kg (185 lb)       Physical Exam  General:  Sitting on bed.   HENT:  No obvious trauma to head  Right Ear:  External ear normal.   Left Ear:  External ear normal.   Nose:  Nose normal.   Eyes:  Conjunctivae and EOM are normal. Pupils are equal, round, and reactive.   Neck: Normal range of motion. Neck supple. No tracheal deviation present.   CV:  Normal heart sounds. No murmur heard.  Pulm/Chest: Effort normal and breath sounds normal.   M/S: Normal range of motion. DP pulse +2 bilateral.  There is a ropey structure on the right inner distal thigh to upper calf with some erythema and warmth, but no crepitus, necrosis or palpable abscess.  Neuro: Alert. GCS 15.  Skin: Skin is warm and dry. No rash noted. Not diaphoretic.   Psych: Normal mood and affect. Behavior is normal.       Emergency Department Course   Imaging:  US Lower Extremity Venous Duplex Right   Final Result   IMPRESSION:   1.  No deep venous thrombosis in the right lower extremity.   2.  Thrombosis of superficial varicose veins extending from the mid thigh to proximal calf.        Report per radiology    Emergency Department Course:  Reviewed:  I reviewed nursing notes, vitals and past medical history    Assessments:  1858 I obtained history and examined the patient as noted above.   1950 I rechecked the patient and explained findings.     Disposition:  The patient was discharged to home.     Impression & Plan   Medical " Decision Making:  Yarelis Leiva is a very pleasant 52 year old year old patient who presents to the emergency department with concern of pain to the right leg.  She has a clinical exam consistent with an infected superficial thrombophlebitis.  Ultrasound confirms no DVT and did confirm the superficial thrombophlebitis.  She has no chest pain or shortness of breath or hypoxia to suggest pulmonary embolism.  I did review the risk and benefit of anticoagulation for this since it is a little bit longer and after doing so she is in agreement against that.  She will try conservative treatment at this time with NSAIDs, compression socks and I will prescribe Keflex since there is some erythema and concern for an infected superficial thrombophlebitis.  She is in agreement to this.  Recommended close follow-up with her primary care physician initially return to the emergency department with any worsening symptoms.    The treatment plan was discussed with the patient and they expressed understanding of this plan and consented to the plan.  In addition, the patient will return to the emergency department if their symptoms persist, worsen, if new symptoms arise or if there is any concern as other pathology may be present that is not evident at this time. They also understand the importance of close follow up in the clinic and if unable to do so will return to the emergency department for a reevaluation. All questions were answered.    Diagnosis:    ICD-10-CM    1. Thrombophlebitis of superficial veins of right lower extremity  I80.01        Discharge Medications:  New Prescriptions    CEPHALEXIN (KEFLEX) 500 MG CAPSULE    Take 1 capsule (500 mg) by mouth 3 times daily for 7 days       Scribe Disclosure:  Tyrell VASQUEZ, am serving as a scribe at 6:58 PM on 8/26/2022 to document services personally performed by Basim Teixeira DO based on my observations and the provider's statements to me.     Basim Teixeira,  DO  08/26/22 2003

## 2022-08-26 NOTE — ED PROVIDER NOTES
Rapid Assessment Note    History:   Yarelis Leiva is a 52 year old female who presents with 2-day history of right leg pain erythema and swelling.  Patient noticed the swelling started yesterday and has progressed to be more prominent over the day today.  Now it has become erythematous.  Symptoms are on the medial surface of her right upper thigh and extend down onto the medial surface of the knee and low and upper portion of the lower leg.    Exam:   General: Alert, No distress. Nontoxic appearance  Head: No signs of trauma.   Mouth/Throat: Oropharynx moist.   Eyes: Conjunctivae are normal. Pupils are equal..   Neck: Normal range of motion.    CV: Appears well perfused.  Resp:No respiratory distress.   MSK: Normal range of motion. No obvious deformity.  Right medial thigh swelling tenderness and erythema extends in a linear fashion along the medial surface of her right knee and upper thigh.  Neuro: The patient is alert and interactive. ROSADO. Speech normal. GCS 15  Skin: No lesion or sign of trauma noted.   Psych: normal mood and affect. behavior is normal.     Plan of Care:   I evaluated the patient and developed an initial plan of care. I discussed this plan and explained that I, or one of my partners, would be returning to complete the evaluation.     Right lower extremity ultrasound -pending.    08/26/2022  [facility]    Portions of this medical record were completed by a scribe. UPON MY REVIEW AND AUTHENTICATION BY ELECTRONIC SIGNATURE, this confirms (a) I performed the applicable clinical services, and (b) the record is accurate.      Leo Holbrook MD  08/26/22 1800

## 2022-08-26 NOTE — ED TRIAGE NOTES
Pain in R leg from below knee up to inner thigh, some redness reported, painful to the touch. Denies any injury to leg. No personal history of blood clots.      Triage Assessment     Row Name 08/26/22 0127       Triage Assessment (Adult)    Airway WDL WDL       Respiratory WDL    Respiratory WDL WDL       Cardiac WDL    Cardiac WDL WDL       Peripheral/Neurovascular WDL    Peripheral Neurovascular WDL WDL       Cognitive/Neuro/Behavioral WDL    Cognitive/Neuro/Behavioral WDL WDL    Row Name 08/26/22 1722       Triage Assessment (Adult)    Airway WDL WDL       Respiratory WDL    Respiratory WDL WDL       Skin Circulation/Temperature WDL    Skin Circulation/Temperature WDL WDL       Cardiac WDL    Cardiac WDL WDL       Peripheral/Neurovascular WDL    Peripheral Neurovascular WDL WDL       Cognitive/Neuro/Behavioral WDL    Cognitive/Neuro/Behavioral WDL WDL

## 2022-09-11 ENCOUNTER — HEALTH MAINTENANCE LETTER (OUTPATIENT)
Age: 52
End: 2022-09-11

## 2023-04-30 ENCOUNTER — HEALTH MAINTENANCE LETTER (OUTPATIENT)
Age: 53
End: 2023-04-30

## 2024-05-04 ENCOUNTER — HEALTH MAINTENANCE LETTER (OUTPATIENT)
Age: 54
End: 2024-05-04

## 2025-05-17 ENCOUNTER — HEALTH MAINTENANCE LETTER (OUTPATIENT)
Age: 55
End: 2025-05-17